# Patient Record
Sex: FEMALE | Race: WHITE | Employment: FULL TIME | URBAN - METROPOLITAN AREA
[De-identification: names, ages, dates, MRNs, and addresses within clinical notes are randomized per-mention and may not be internally consistent; named-entity substitution may affect disease eponyms.]

---

## 2019-09-30 ENCOUNTER — OFFICE VISIT (OUTPATIENT)
Dept: ENDOCRINOLOGY | Age: 29
End: 2019-09-30
Payer: COMMERCIAL

## 2019-09-30 VITALS
HEART RATE: 130 BPM | RESPIRATION RATE: 24 BRPM | OXYGEN SATURATION: 99 % | SYSTOLIC BLOOD PRESSURE: 138 MMHG | WEIGHT: 114.4 LBS | DIASTOLIC BLOOD PRESSURE: 88 MMHG | BODY MASS INDEX: 18.39 KG/M2 | HEIGHT: 66 IN

## 2019-09-30 DIAGNOSIS — E05.90 HYPERTHYROIDISM: Primary | ICD-10-CM

## 2019-09-30 PROCEDURE — 99204 OFFICE O/P NEW MOD 45 MIN: CPT | Performed by: INTERNAL MEDICINE

## 2019-09-30 RX ORDER — PROPRANOLOL HCL 60 MG
60 CAPSULE, EXTENDED RELEASE 24HR ORAL DAILY
Qty: 30 CAPSULE | Refills: 3 | Status: SHIPPED | OUTPATIENT
Start: 2019-09-30 | End: 2019-11-26

## 2019-09-30 RX ORDER — RANITIDINE 150 MG/1
150 TABLET ORAL
COMMUNITY
End: 2021-03-23 | Stop reason: ALTCHOICE

## 2019-09-30 RX ORDER — DEXTROAMPHETAMINE SACCHARATE, AMPHETAMINE ASPARTATE, DEXTROAMPHETAMINE SULFATE AND AMPHETAMINE SULFATE 7.5; 7.5; 7.5; 7.5 MG/1; MG/1; MG/1; MG/1
30 TABLET ORAL
COMMUNITY

## 2019-09-30 NOTE — PROGRESS NOTES
found for: TSH  No results found for: FREET4      Assessment:     Hyperthyroidism:  Will recheck levels, thyroid scan to work up cause. She has h/o miscarriages, plans for pregnancy. Will avoid VAZQUEZ for treatment, discussed PTU/side effects. Start treatment after results. Plan: 1. Check TFT, Trab  2. Thyroid scan and USG  3. Treatment after results, plan for PTU given plan for pregnancy.

## 2019-10-17 ENCOUNTER — HOSPITAL ENCOUNTER (OUTPATIENT)
Age: 29
Discharge: HOME | End: 2019-10-17
Payer: COMMERCIAL

## 2019-10-17 ENCOUNTER — HOSPITAL ENCOUNTER (OUTPATIENT)
Dept: ULTRASOUND IMAGING | Age: 29
Discharge: HOME | End: 2019-10-17
Payer: COMMERCIAL

## 2019-10-17 ENCOUNTER — HOSPITAL ENCOUNTER (OUTPATIENT)
Dept: NUCLEAR MEDICINE | Age: 29
Discharge: HOME | End: 2019-10-17
Payer: COMMERCIAL

## 2019-10-17 DIAGNOSIS — E05.90 HYPERTHYROIDISM: ICD-10-CM

## 2019-10-17 LAB
T3 FREE: 10.8 PG/ML (ref 2.3–4.2)
T4 FREE: 2.7 NG/DL (ref 0.9–1.8)
TSH REFLEX: <0.01 UIU/ML (ref 0.27–4.2)

## 2019-10-17 PROCEDURE — 84439 ASSAY OF FREE THYROXINE: CPT

## 2019-10-17 PROCEDURE — 83520 IMMUNOASSAY QUANT NOS NONAB: CPT

## 2019-10-17 PROCEDURE — 36415 COLL VENOUS BLD VENIPUNCTURE: CPT

## 2019-10-17 PROCEDURE — 84443 ASSAY THYROID STIM HORMONE: CPT

## 2019-10-17 PROCEDURE — A9516 IODINE I-123 SOD IODIDE MIC: HCPCS | Performed by: INTERNAL MEDICINE

## 2019-10-17 PROCEDURE — 84481 FREE ASSAY (FT-3): CPT

## 2019-10-17 PROCEDURE — 78014 THYROID IMAGING W/BLOOD FLOW: CPT

## 2019-10-17 PROCEDURE — 3430000000 HC RX DIAGNOSTIC RADIOPHARMACEUTICAL: Performed by: INTERNAL MEDICINE

## 2019-10-17 PROCEDURE — 76536 US EXAM OF HEAD AND NECK: CPT

## 2019-10-17 RX ORDER — PROPYLTHIOURACIL 50 MG/1
100 TABLET ORAL 3 TIMES DAILY
Qty: 90 TABLET | Refills: 3 | Status: SHIPPED | OUTPATIENT
Start: 2019-10-17 | End: 2019-11-26

## 2019-10-17 RX ADMIN — SODIUM IODIDE I 123 200 MICRO CURIE: 100 CAPSULE, GELATIN COATED ORAL at 10:00

## 2019-10-18 ENCOUNTER — HOSPITAL ENCOUNTER (OUTPATIENT)
Dept: NUCLEAR MEDICINE | Age: 29
Discharge: HOME | End: 2019-10-18
Payer: COMMERCIAL

## 2019-10-18 LAB — TSH RECEPTOR AB: 4.79 IU/L

## 2019-10-22 ENCOUNTER — TELEPHONE (OUTPATIENT)
Dept: ENDOCRINOLOGY | Age: 29
End: 2019-10-22

## 2019-11-26 ENCOUNTER — OFFICE VISIT (OUTPATIENT)
Dept: ENDOCRINOLOGY | Age: 29
End: 2019-11-26
Payer: COMMERCIAL

## 2019-11-26 VITALS
BODY MASS INDEX: 19.38 KG/M2 | OXYGEN SATURATION: 98 % | SYSTOLIC BLOOD PRESSURE: 134 MMHG | WEIGHT: 120.6 LBS | HEART RATE: 67 BPM | DIASTOLIC BLOOD PRESSURE: 83 MMHG | HEIGHT: 66 IN

## 2019-11-26 DIAGNOSIS — E05.90 HYPERTHYROIDISM: Primary | ICD-10-CM

## 2019-11-26 PROCEDURE — 99213 OFFICE O/P EST LOW 20 MIN: CPT | Performed by: INTERNAL MEDICINE

## 2019-11-26 RX ORDER — METHIMAZOLE 10 MG/1
TABLET ORAL
Qty: 60 TABLET | Refills: 1 | Status: SHIPPED | OUTPATIENT
Start: 2019-11-26 | End: 2021-03-23

## 2021-03-23 ENCOUNTER — OFFICE VISIT (OUTPATIENT)
Dept: ENDOCRINOLOGY | Age: 31
End: 2021-03-23
Payer: COMMERCIAL

## 2021-03-23 VITALS
WEIGHT: 121 LBS | SYSTOLIC BLOOD PRESSURE: 137 MMHG | RESPIRATION RATE: 18 BRPM | HEIGHT: 66 IN | DIASTOLIC BLOOD PRESSURE: 96 MMHG | BODY MASS INDEX: 19.44 KG/M2 | HEART RATE: 87 BPM | OXYGEN SATURATION: 98 %

## 2021-03-23 DIAGNOSIS — E05.00 GRAVES DISEASE: Primary | ICD-10-CM

## 2021-03-23 DIAGNOSIS — Z72.0 TOBACCO ABUSE: ICD-10-CM

## 2021-03-23 PROCEDURE — 99214 OFFICE O/P EST MOD 30 MIN: CPT | Performed by: INTERNAL MEDICINE

## 2021-03-23 RX ORDER — VALACYCLOVIR HYDROCHLORIDE 500 MG/1
500 TABLET, FILM COATED ORAL
COMMUNITY
Start: 2021-01-25

## 2021-03-23 NOTE — PROGRESS NOTES
Subjective:      26 y/o WF who is here for thyroid evaluation. Interim:    Seen after 11/19  Did not f/u  Was on tapazole 10mg BID  Had TFT    2 miscarriages in last year    Has hair loss, bodycahe  Brain fog, fatigue  Vision issues    She had labs 6/19, showed hyperthyroidism. Has Graves'    Labs:    FT4 4.19 FT3 16.6 TSH <0.006    Moderate, uncontrolled. Does not remember if had thyroid labs prior to it. At age 15 years, was told may have hyperthyroidism    She has had two miscarriages at 16 weeks, last 5/19  She has one child    Initial complaints: weight loss, palpitations for 3-4 months, no diarrhea/heat intolerance  History of obstructive symptoms:  difficulty swallowing No, changes in voice/hoarseness  No.  No eye changes    History of radiation to patient's neck:   No  Recent iodine exposure:  No  Family history includes paternal aunt, thyroid cancer    Current smoking of cigarettes or cigars: no    She is on Adderall for ADD    9/19  TSH < 0.01  FT4 2.7 FT3 10.8    USG shows heterogenous appearance, no nodules  1.  Elevated 24 hour uptake of 82% can be seen with Graves disease.  No   dominant hot or cold nodule. Start on PTU 100mg TID    11/19 TSH <0.015 Free level normal  4/20 TSH 2.06  Tapazole 10mg BID  3/21 TSH > 161    High cholesterol 265 on 3/21    Past Medical History:   Diagnosis Date    Acid reflux     ADD (attention deficit disorder)     Hyperthyroidism        History reviewed. No pertinent surgical history. Current Outpatient Medications   Medication Sig Dispense Refill    valACYclovir (VALTREX) 500 MG tablet Take 500 mg by mouth      methIMAzole (TAPAZOLE) 10 MG tablet One tab PO BID 60 tablet 1    amphetamine-dextroamphetamine (ADDERALL) 30 MG tablet Take 30 mg by mouth. No current facility-administered medications for this visit.         Review of Systems  Please see scanned     Objective:    BP (!) 137/96   Pulse 87   Resp 18   Ht 5' 6\" (1.676 m) Wt 121 lb (54.9 kg)   LMP 02/27/2021 (Exact Date)   SpO2 98%   Breastfeeding No   BMI 19.53 kg/m²   Wt Readings from Last 3 Encounters:   03/23/21 121 lb (54.9 kg)   11/26/19 120 lb 9.6 oz (54.7 kg)   09/30/19 114 lb 6.4 oz (51.9 kg)       Constitutional: Well-developed, appears stated age, cooperative, in no acute distress  H/E/N/M/T:atraumatic, normocephalic, external ears, nose, lips normal without lesions  Eyes: Lids, lashes, conjunctivae and sclerae normal, No proptosis, no redness  Neck: supple, symmetrical, no swelling  Skin: No obvious rashes or lesions present. Skin and hair texture normal  Psychiatric: Judgement and Insight:  judgement and insight appear normal  Neuro: Normal without focal findings, speech is normal normal, speech is spontaneous  Chest: No labored breathing, no chest deformity, no stridor  Musculoskeletal: No joint deformity, swelling  Thyroid: enlarged    Lab Review  Lab Results   Component Value Date    .000 03/19/2021     No results found for: FREET4      Assessment:     Hyperthyroidism:  Secondary to Graves'. She has h/o miscarriages, plans for pregnancy. She got lost to f/u, was taking tapazole, level not being monitored. Labs indicate severe hypothyroidism  Advised importance of regular f/u. Her symptoms are likely due to low thyroid. She may be in remission  Advised if levels fluctuate may plan for VAZQUEZ. She would like to try to avoid it  F.u in 4 weeks    Advised smoking cessation. See opthamology     Plan: 1. Stop Tapazole  2. TFT in 4 weeks  3. Advised smoking cessation

## 2021-05-14 ENCOUNTER — VIRTUAL VISIT (OUTPATIENT)
Dept: ENDOCRINOLOGY | Age: 31
End: 2021-05-14
Payer: COMMERCIAL

## 2021-05-14 DIAGNOSIS — Z72.0 TOBACCO ABUSE: ICD-10-CM

## 2021-05-14 DIAGNOSIS — E05.00 GRAVES DISEASE: Primary | ICD-10-CM

## 2021-05-14 PROCEDURE — 99213 OFFICE O/P EST LOW 20 MIN: CPT | Performed by: INTERNAL MEDICINE

## 2021-05-14 NOTE — PROGRESS NOTES
cholesterol 265 on 3/21    She was smoking but now quit for 3 weeks    Past Medical History:   Diagnosis Date    Acid reflux     ADD (attention deficit disorder)     Hyperthyroidism        History reviewed. No pertinent surgical history. Current Outpatient Medications   Medication Sig Dispense Refill    amphetamine-dextroamphetamine (ADDERALL) 30 MG tablet Take 30 mg by mouth.  valACYclovir (VALTREX) 500 MG tablet Take 500 mg by mouth       No current facility-administered medications for this visit. Review of Systems  Constitutional: Negative for weight loss and malaise/fatigue. Negative for fever and chills. HENT: Negative for hearing loss, ear pain, nosebleeds, neck pain and tinnitus. Eyes: Negative for blurred vision. Negative for double vision, photophobia and pain. Respiratory: Negative for cough and sputum production. Cardiovascular: Negative for chest pain, palpitations and leg swelling. Gastrointestinal: Negative for nausea, vomiting and abdominal pain. Genitourinary: Negative for dysuria, urgency and frequency. Musculoskeletal: Negative for back pain. No joint pain  Skin: Negative for itching and rash. Neurological: Negative for dizziness. Negative for tingling, tremors, focal weakness and headaches. Endo/Heme/Allergies: see HPI  Psychiatric/Behavioral: Negative for depression and substance abuse.          PHYSICAL EXAMINATION:  [ INSTRUCTIONS:  \"[x]\" Indicates a positive item  \"[]\" Indicates a negative item  -- DELETE ALL ITEMS NOT EXAMINED]  Vital Signs: (As obtained by patient/caregiver or practitioner observation)    Blood pressure-  Heart rate-    Respiratory rate-    Temperature-  Pulse oximetry-     Constitutional Appears well-developed and well-nourished No apparent distress        Mental status  Alert and awake  Oriented to person/place/time  Able to follow commands      Eyes:  EOM    [x]  Normal    Sclera  [x]  Normal           Discharge [x]  None visible HENT:   [x] Normocephalic, atraumatic. [x] Mouth/Throat: Mucous membranes are moist.     External Ears [x] Normal  no discharge    Neck: [x] No visualized mass  no swelling    Pulmonary/Chest: [x] Respiratory effort normal.  [x] No visualized signs of difficulty breathing or respiratory distress             Musculoskeletal:   [x] Normal gait with no signs of ataxia         [x] Normal range of motion of neck          Head and neck stable, appears normal ROM, strength good    Neurological:        [x] No Facial Asymmetry (Cranial nerve 7 motor function) (limited exam to video visit)          [x] No gaze palsy                Skin:        [x] No significant exanthematous lesions or discoloration noted on facial skin                 Psychiatric:       [x] Normal Affect [x] No Hallucinations            Other pertinent observable physical exam findings-     Due to this being a TeleHealth encounter, evaluation of the following organ systems is limited: Vitals/Constitutional/EENT/Resp/CV/GI//MS/Neuro/Skin/Heme-Lymph-Imm. Services were provided through a video synchronous discussion virtually to substitute for in-person clinic visit. Lab Review  Lab Results   Component Value Date    TSH 4.120 05/12/2021     No results found for: FREET4      Assessment:     Hyperthyroidism:  Secondary to Graves'. She has h/o miscarriages, plans for pregnancy. She got lost to f/u, was taking tapazole, level not being monitored. Labs indicate severe hypothyroidism  Advised importance of regular f/u. Her symptoms are likely due to low thyroid. She is in remission  Advised if levels fluctuate may plan for VAZQUEZ. She would like to try to avoid it  Repeat level off medication normal    Advised smoking cessation. See opthamology. She quit     Plan: 1. Continue to hold Tapazole  2. TFT in 8 weeks  3. Advised smoking cessation

## 2021-09-13 ENCOUNTER — OFFICE VISIT (OUTPATIENT)
Dept: ENDOCRINOLOGY | Age: 31
End: 2021-09-13
Payer: COMMERCIAL

## 2021-09-13 VITALS
DIASTOLIC BLOOD PRESSURE: 83 MMHG | WEIGHT: 123.6 LBS | OXYGEN SATURATION: 98 % | BODY MASS INDEX: 19.86 KG/M2 | HEART RATE: 113 BPM | SYSTOLIC BLOOD PRESSURE: 120 MMHG | HEIGHT: 66 IN

## 2021-09-13 DIAGNOSIS — E05.90 HYPERTHYROIDISM: Primary | ICD-10-CM

## 2021-09-13 PROCEDURE — 99213 OFFICE O/P EST LOW 20 MIN: CPT | Performed by: INTERNAL MEDICINE

## 2021-09-13 RX ORDER — ASPIRIN 81 MG/1
81 TABLET, CHEWABLE ORAL DAILY
COMMUNITY
End: 2022-05-12 | Stop reason: ALTCHOICE

## 2021-09-13 NOTE — PROGRESS NOTES
Subjective:      26 y/o WF who is here for thyroid evaluation. Interim:    Off tapazole   18 weeks pregnant  Stable  Pregnancy good per patient  Gyn with [facility]  TPO Ab requested by Gyn    Previously:    Seen after 11/19  Did not f/u  Was on tapazole 10mg BID  Had TFT    2 miscarriages in last year    Has hair loss, bodycahe  Brain fog, fatigue  Vision issues    She had labs 6/19, showed hyperthyroidism. Has Graves'    Labs:    FT4 4.19 FT3 16.6 TSH <0.006    Moderate, uncontrolled. Does not remember if had thyroid labs prior to it. At age 15 years, was told may have hyperthyroidism    She has had two miscarriages at 16 weeks, last 5/19  She has one child    Initial complaints: weight loss, palpitations for 3-4 months, no diarrhea/heat intolerance  History of obstructive symptoms:  difficulty swallowing No, changes in voice/hoarseness  No.  No eye changes    History of radiation to patient's neck:   No  Recent iodine exposure:  No  Family history includes paternal aunt, thyroid cancer    Current smoking of cigarettes or cigars: no    She is on Adderall for ADD    9/19  TSH < 0.01  FT4 2.7 FT3 10.8    USG shows heterogenous appearance, no nodules  1.  Elevated 24 hour uptake of 82% can be seen with Graves disease.  No   dominant hot or cold nodule. Started on PTU 100mg TID    11/19 TSH <0.015 Free level normal  4/20 TSH 2.06  Tapazole 10mg BID  3/21 TSH > 161  5/21 TSH 4.1  Off tapazole    7/21 TSH 0.36    High cholesterol 265 on 3/21    She was smoking but now quit for 3 weeks    Past Medical History:   Diagnosis Date    Acid reflux     ADD (attention deficit disorder)     Hyperthyroidism        No past surgical history on file. Current Outpatient Medications   Medication Sig Dispense Refill    valACYclovir (VALTREX) 500 MG tablet Take 500 mg by mouth      amphetamine-dextroamphetamine (ADDERALL) 30 MG tablet Take 30 mg by mouth.        No current facility-administered medications for this visit. Review of Systems  Scanned, reviewed    PHYSICAL EXAMINATION:    Vitals:    09/13/21 1549   BP: 120/83   Pulse: 113   SpO2: 98%       Constitutional: Well-developed, appears stated age, cooperative, in no acute distress  H/E/N/M/T:atraumatic, normocephalic, external ears, nose, lips normal without lesions  Eyes: Lids, lashes, conjunctivae and sclerae normal, No proptosis, no redness  Neck: supple, symmetrical, no swelling  Skin: No obvious rashes or lesions present. Skin and hair texture normal  Psychiatric: Judgement and Insight:  judgement and insight appear normal  Neuro: Normal without focal findings, speech is normal normal, speech is spontaneous  Chest: No labored breathing, no chest deformity, no stridor  Musculoskeletal: No joint deformity, swelling      Lab Review  Lab Results   Component Value Date    TSH 0.366 07/22/2021     No results found for: FREET4      Assessment:     Hyperthyroidism:  Secondary to Graves'. She has h/o miscarriages, plans for pregnancy. She got lost to f/u, was taking tapazole, level not being monitored. Labs indicated severe hypothyroidism  Advised importance of regular f/u. Her symptoms are likely due to low thyroid. She is in remission  Advised if levels fluctuate may plan for VAZQUEZ. She would like to try to avoid it  Repeat level off medication normal. Last TSH slightly low. Given pregnancy, planned to monitor  Check levels  She is tachycardic but states even before pregnancy    Advised smoking cessation. See opthamology. She quit     Plan: 1. Continue to hold Tapazole  2. TFT today and in 6 weeks  3. F/u in 4 months

## 2022-01-17 ENCOUNTER — OFFICE VISIT (OUTPATIENT)
Dept: ENDOCRINOLOGY | Age: 32
End: 2022-01-17
Payer: COMMERCIAL

## 2022-01-17 VITALS
HEART RATE: 133 BPM | DIASTOLIC BLOOD PRESSURE: 88 MMHG | OXYGEN SATURATION: 99 % | WEIGHT: 146.4 LBS | HEIGHT: 66 IN | SYSTOLIC BLOOD PRESSURE: 124 MMHG | BODY MASS INDEX: 23.53 KG/M2

## 2022-01-17 DIAGNOSIS — E05.00 GRAVES DISEASE: ICD-10-CM

## 2022-01-17 DIAGNOSIS — E05.90 HYPERTHYROIDISM: Primary | ICD-10-CM

## 2022-01-17 PROCEDURE — 99214 OFFICE O/P EST MOD 30 MIN: CPT | Performed by: INTERNAL MEDICINE

## 2022-01-17 RX ORDER — METHIMAZOLE 5 MG/1
TABLET ORAL
Qty: 30 TABLET | Refills: 3 | Status: SHIPPED | OUTPATIENT
Start: 2022-01-17 | End: 2022-02-09

## 2022-01-17 NOTE — PROGRESS NOTES
Subjective:      26 y/o WF who is here for thyroid evaluation. Interim:    C/o excessive sweating, heat intolerance    Off tapazole   36  weeks pregnant    Stable  Pregnancy good per patient  Gyn with [facility]  TPO Ab requested by Gyn    Second pregnancy    Previously:    Seen after 11/19  Did not f/u  Was on tapazole 10mg BID  Had TFT    2 miscarriages in last year    Has hair loss, bodycahe  Brain fog, fatigue  Vision issues    She had labs 6/19, showed hyperthyroidism. Has Graves'    Labs:    FT4 4.19 FT3 16.6 TSH <0.006    Moderate, uncontrolled. Does not remember if had thyroid labs prior to it. At age 15 years, was told may have hyperthyroidism    She has had two miscarriages at 16 weeks, last 5/19  She has one child    Initial complaints: weight loss, palpitations for 3-4 months, no diarrhea/heat intolerance  History of obstructive symptoms:  difficulty swallowing No, changes in voice/hoarseness  No.  No eye changes    History of radiation to patient's neck:   No  Recent iodine exposure:  No  Family history includes paternal aunt, thyroid cancer    Current smoking of cigarettes or cigars: no    She is on Adderall for ADD    9/19  TSH < 0.01  FT4 2.7 FT3 10.8    USG shows heterogenous appearance, no nodules  1.  Elevated 24 hour uptake of 82% can be seen with Graves disease.  No   dominant hot or cold nodule. Started on PTU 100mg TID    11/19 TSH <0.015 Free level normal  4/20 TSH 2.06  Tapazole 10mg BID  3/21 TSH > 161  5/21 TSH 4.1  Off tapazole    7/21 TSH 0.36  9/21 TSH 0.9  12/21 TSH 0.278  1/22 TSH 0.079 FT4 1.22    High cholesterol 265 on 3/21    She was smoking but now quit for 3 weeks    Past Medical History:   Diagnosis Date    Acid reflux     ADD (attention deficit disorder)     Hyperthyroidism        History reviewed. No pertinent surgical history.   Current Outpatient Medications   Medication Sig Dispense Refill    aspirin 81 MG chewable tablet Take 81 mg by mouth daily      Prenatal Multivit-Min-Fe-FA (PRE- PO) Take by mouth      valACYclovir (VALTREX) 500 MG tablet Take 500 mg by mouth      amphetamine-dextroamphetamine (ADDERALL) 30 MG tablet Take 30 mg by mouth. No current facility-administered medications for this visit. Review of Systems  Scanned, reviewed    PHYSICAL EXAMINATION:    Vitals:    22 1024   BP: 124/88   Pulse: 133   SpO2: 99%       Constitutional: Well-developed, appears stated age, cooperative, in no acute distress  H/E/N/M/T:atraumatic, normocephalic, external ears, nose, lips normal without lesions  Eyes: Lids, lashes, conjunctivae and sclerae normal, No proptosis, no redness  Neck: supple, symmetrical, no swelling  Skin: No obvious rashes or lesions present. Skin and hair texture normal  Psychiatric: Judgement and Insight:  judgement and insight appear normal  Neuro: Normal without focal findings, speech is normal normal, speech is spontaneous  Chest: No labored breathing, no chest deformity, no stridor  Musculoskeletal: No joint deformity, swelling      Lab Review  Lab Results   Component Value Date    TSH 0.079 2022     No results found for: FREET4      Assessment:     Hyperthyroidism:  Secondary to Graves'. She has h/o miscarriages, plans for pregnancy. She was in remission, now TSH lower. She has had tachycardia. Some symptoms which can be due to pregnancy. Will restart low dose tapazole. Advised if levels fluctuate may plan for VAZQUEZ. She would like to try to avoid it    Advised smoking cessation. See opthamology. She quit     Tachycardia: She is subclinical hyperthyroid, has a h/o tachycardia. Free level normal so thyroid may not be causing it. She sees her OB/Gyn today, advised to discuss    Plan: 1. Tapazole 5mg daily  2. TFT in 3 months  3. F/u in 3  months

## 2022-02-09 RX ORDER — METHIMAZOLE 5 MG/1
TABLET ORAL
Qty: 30 TABLET | Refills: 3 | Status: SHIPPED | OUTPATIENT
Start: 2022-02-09 | End: 2022-05-12 | Stop reason: SDUPTHER

## 2022-05-11 LAB
ALBUMIN SERPL-MCNC: 3.9 GM/DL
ALP BLD-CCNC: 86 UNIT/L
ALT SERPL-CCNC: 22 UNIT/L
AST SERPL-CCNC: 32 UNIT/L
BILIRUB SERPL-MCNC: <0.1 MG/DL
BILIRUBIN DIRECT: 0 MG/DL
BILIRUBIN, INDIRECT: <0.1 MG/DL
PROTEIN TOTAL: 6.8 GM/DL
T4 FREE: 3.17 NG/DL
TSH SERPL DL<=0.05 MIU/L-ACNC: <0.015 UIU/ML

## 2022-05-12 ENCOUNTER — OFFICE VISIT (OUTPATIENT)
Dept: ENDOCRINOLOGY | Age: 32
End: 2022-05-12
Payer: COMMERCIAL

## 2022-05-12 VITALS
HEIGHT: 66 IN | SYSTOLIC BLOOD PRESSURE: 122 MMHG | WEIGHT: 119.2 LBS | HEART RATE: 112 BPM | DIASTOLIC BLOOD PRESSURE: 70 MMHG | BODY MASS INDEX: 19.16 KG/M2 | OXYGEN SATURATION: 98 %

## 2022-05-12 DIAGNOSIS — E05.90 HYPERTHYROIDISM: Primary | ICD-10-CM

## 2022-05-12 PROCEDURE — 99214 OFFICE O/P EST MOD 30 MIN: CPT | Performed by: INTERNAL MEDICINE

## 2022-05-12 RX ORDER — METHIMAZOLE 10 MG/1
TABLET ORAL
Qty: 30 TABLET | Refills: 3 | Status: SHIPPED | OUTPATIENT
Start: 2022-05-12 | End: 2022-05-17 | Stop reason: SDUPTHER

## 2022-05-12 NOTE — PROGRESS NOTES
Subjective:      28 y/o WF who is here for thyroid evaluation. Interim:    3 month post partum  She is breast feeding    C/o excessive sweating, heat intolerance  Weight loss      Stable  Pregnancy good per patient  Gyn with [facility]  TPO Ab requested by Gyn    Second pregnancy    Previously:    Seen after 11/19  Did not f/u  Was on tapazole 10mg BID  Had TFT    2 miscarriages in a year    Has hair loss, bodycahe  Brain fog, fatigue  Vision issues    She had labs 6/19, showed hyperthyroidism. Has Graves'    Labs:    FT4 4.19 FT3 16.6 TSH <0.006    Moderate, uncontrolled. Does not remember if had thyroid labs prior to it. At age 15 years, was told may have hyperthyroidism    She has had two miscarriages at 16 weeks, last 5/19  She has one child    Initial complaints: weight loss, palpitations for 3-4 months, no diarrhea/heat intolerance  History of obstructive symptoms:  difficulty swallowing No, changes in voice/hoarseness  No.  No eye changes    History of radiation to patient's neck:   No  Recent iodine exposure:  No  Family history includes paternal aunt, thyroid cancer    Current smoking of cigarettes or cigars: no    She is on Adderall for ADD    9/19  TSH < 0.01  FT4 2.7 FT3 10.8    USG shows heterogenous appearance, no nodules  1.  Elevated 24 hour uptake of 82% can be seen with Graves disease.  No   dominant hot or cold nodule. Started on PTU 100mg TID    11/19 TSH <0.015 Free level normal  4/20 TSH 2.06  Tapazole 10mg BID  3/21 TSH > 161  5/21 TSH 4.1  Off tapazole    7/21 TSH 0.36  9/21 TSH 0.9  12/21 TSH 0.278  1/22 TSH 0.079 FT4 1.22   5/22 TSH <0.015  FT4 3.17  On tapazole 5mg      High cholesterol 265 on 3/21    She was smoking but now quit for 3 weeks    Past Medical History:   Diagnosis Date    Acid reflux     ADD (attention deficit disorder)     Hyperthyroidism        History reviewed. No pertinent surgical history.   Current Outpatient Medications   Medication Sig Dispense Refill    methIMAzole (TAPAZOLE) 5 MG tablet TAKE 1 TABLET BY MOUTH EVERY DAY 30 tablet 3    aspirin 81 MG chewable tablet Take 81 mg by mouth daily      Prenatal Multivit-Min-Fe-FA (PRE- PO) Take by mouth      valACYclovir (VALTREX) 500 MG tablet Take 500 mg by mouth      amphetamine-dextroamphetamine (ADDERALL) 30 MG tablet Take 30 mg by mouth. No current facility-administered medications for this visit. Review of Systems  Scanned, reviewed    PHYSICAL EXAMINATION:    Vitals:    22 1607   BP: 122/70   Pulse: 112   SpO2: 98%       Constitutional: Well-developed, appears stated age, cooperative, in no acute distress  H/E/N/M/T:atraumatic, normocephalic, external ears, nose, lips normal without lesions  Eyes: Lids, lashes, conjunctivae and sclerae normal, No proptosis, no redness  Neck: supple, symmetrical, no swelling  Skin: No obvious rashes or lesions present. Skin and hair texture normal  Psychiatric: Judgement and Insight:  judgement and insight appear normal  Neuro: Normal without focal findings, speech is normal normal, speech is spontaneous  Chest: No labored breathing, no chest deformity, no stridor  Musculoskeletal: No joint deformity, swelling      Lab Review  Lab Results   Component Value Date    TSH <0.015 2022     No results found for: FREET4      Assessment:     Hyperthyroidism:  Secondary to Graves'. She has h/o miscarriages, plans for pregnancy. She was in remission, had Relapse, TSH lower,  restarted low dose tapazole. Still hyperthyroid, increase dose  Advise to take tapazole after breast feeding. Discussed it is present in breast milk in low dose and is recommended can be continued with a max dose of 20mg/day. Advised weight monitoring of the baby. Advised if levels fluctuate may plan for VAZQUEZ. She would like to try to avoid it    Advised smoking cessation. See opthamology. She quit     Tachycardia: She is subclinical hyperthyroid    Plan: 1.Tapazole 5mg ---> 10mg daily  2. TFT in 2 months and then 4 months  3. F/u in 4  months

## 2022-05-17 RX ORDER — METHIMAZOLE 10 MG/1
TABLET ORAL
Qty: 45 TABLET | Refills: 3 | Status: SHIPPED | OUTPATIENT
Start: 2022-05-17 | End: 2022-06-10

## 2022-06-10 RX ORDER — METHIMAZOLE 10 MG/1
TABLET ORAL
Qty: 45 TABLET | Refills: 3 | Status: SHIPPED | OUTPATIENT
Start: 2022-06-10 | End: 2022-06-21 | Stop reason: SDUPTHER

## 2022-06-10 NOTE — TELEPHONE ENCOUNTER
Medication:   Requested Prescriptions     Pending Prescriptions Disp Refills    methIMAzole (TAPAZOLE) 10 MG tablet [Pharmacy Med Name: METHIMAZOLE 10 MG TABLET] 45 tablet 3     Sig: TAKE 1 AND 1/2 TABLETS BY MOUTH EVERY DAY       Last Filled:      Patient Phone Number: [phone] (home)     Last appt: 5/12/2022   Next appt: 9/15/2022    Last Thyroid:   Lab Results   Component Value Date    TSH <0.015 05/11/2022    FT3 10.8 10/17/2019    T4FREE 3.17 05/11/2022

## 2022-06-20 LAB — TSH SERPL DL<=0.05 MIU/L-ACNC: <0.015 UIU/ML

## 2022-06-21 RX ORDER — METHIMAZOLE 10 MG/1
TABLET ORAL
Qty: 60 TABLET | Refills: 1 | Status: SHIPPED | OUTPATIENT
Start: 2022-06-21 | End: 2022-08-24

## 2022-07-08 RX ORDER — METHIMAZOLE 5 MG/1
TABLET ORAL
Qty: 30 TABLET | Refills: 3 | OUTPATIENT
Start: 2022-07-08

## 2022-08-24 RX ORDER — METHIMAZOLE 10 MG/1
TABLET ORAL
Qty: 60 TABLET | Refills: 1 | Status: SHIPPED | OUTPATIENT
Start: 2022-08-24 | End: 2022-09-26 | Stop reason: SDUPTHER

## 2022-08-24 NOTE — TELEPHONE ENCOUNTER
Medication:   Requested Prescriptions     Pending Prescriptions Disp Refills    methIMAzole (TAPAZOLE) 10 MG tablet [Pharmacy Med Name: METHIMAZOLE 10 MG TABLET] 60 tablet 1     Sig: TAKE 2 TABLETS BY MOUTH EVERY DAY       Last Filled:      Patient Phone Number: [phone] (home)     Last appt: 1/17/2022   Next appt: 09/15/2022    Last Thyroid:   Lab Results   Component Value Date/Time    TSH <0.015 06/20/2022 02:17 PM    FT3 10.8 10/17/2019 09:47 AM    T4FREE 3.17 05/11/2022 04:08 PM

## 2022-09-19 RX ORDER — METHIMAZOLE 10 MG/1
TABLET ORAL
Qty: 60 TABLET | Refills: 1 | OUTPATIENT
Start: 2022-09-19

## 2022-09-19 NOTE — TELEPHONE ENCOUNTER
Medication:   Requested Prescriptions     Pending Prescriptions Disp Refills    methIMAzole (TAPAZOLE) 10 MG tablet [Pharmacy Med Name: METHIMAZOLE 10 MG TABLET] 60 tablet 1     Sig: TAKE 2 TABLETS BY MOUTH EVERY DAY       Last Filled:      Patient Phone Number: [phone] (home)     Last appt: 05/12/2022  Next appt: Visit date not found    Last Thyroid:   Lab Results   Component Value Date/Time    TSH <0.015 06/20/2022 02:17 PM    FT3 10.8 10/17/2019 09:47 AM    T4FREE 3.17 05/11/2022 04:08 PM

## 2022-09-22 LAB
ALBUMIN SERPL-MCNC: 4.1 GM/DL
ALP BLD-CCNC: 78 UNIT/L
ALT SERPL-CCNC: 14 UNIT/L
AST SERPL-CCNC: 21 UNIT/L
BILIRUB SERPL-MCNC: 0.3 MG/DL
BILIRUBIN DIRECT: 0.1 MG/DL
PROTEIN TOTAL: 6.7 GM/DL
T4 FREE: 0.45 NG/DL
TSH SERPL DL<=0.05 MIU/L-ACNC: 16.6 UIU/ML

## 2022-09-26 ENCOUNTER — OFFICE VISIT (OUTPATIENT)
Dept: ENDOCRINOLOGY | Age: 32
End: 2022-09-26
Payer: COMMERCIAL

## 2022-09-26 VITALS
HEIGHT: 66 IN | HEART RATE: 85 BPM | SYSTOLIC BLOOD PRESSURE: 120 MMHG | BODY MASS INDEX: 18.32 KG/M2 | WEIGHT: 114 LBS | OXYGEN SATURATION: 99 % | DIASTOLIC BLOOD PRESSURE: 78 MMHG

## 2022-09-26 DIAGNOSIS — E05.90 HYPERTHYROIDISM: Primary | ICD-10-CM

## 2022-09-26 PROCEDURE — 99214 OFFICE O/P EST MOD 30 MIN: CPT | Performed by: INTERNAL MEDICINE

## 2022-09-26 RX ORDER — METHIMAZOLE 10 MG/1
TABLET ORAL
Qty: 30 TABLET | Refills: 3 | Status: SHIPPED | OUTPATIENT
Start: 2022-09-26 | End: 2022-10-16 | Stop reason: SDUPTHER

## 2022-09-26 NOTE — PROGRESS NOTES
Subjective:      26 y/o WF who is here for thyroid evaluation. Interim:    7 month post partum  She is breast feeding  Plan for a year    She may plan for pregnancy after it    C/o excessive sweating, heat intolerance  Weight loss      Stable  Pregnancy good per patient  [facility]yn with [facility]  TPO Ab requested by Gyn    Second pregnancy    Previously:    Seen after 11/19  Did not f/u  Was on tapazole 10mg BID  Had TFT    2 miscarriages in a year    Has hair loss, bodycahe  Brain fog, fatigue  Vision issues    She had labs 6/19, showed hyperthyroidism. Has Graves'    Labs:    FT4 4.19 FT3 16.6 TSH <0.006    Moderate, uncontrolled. Does not remember if had thyroid labs prior to it. At age 15 years, was told may have hyperthyroidism    She has had two miscarriages at 16 weeks, last 5/19  She has two child    Initial complaints: weight loss, palpitations for 3-4 months, no diarrhea/heat intolerance  History of obstructive symptoms:  difficulty swallowing No, changes in voice/hoarseness  No.  No eye changes    History of radiation to patient's neck:   No  Recent iodine exposure:  No  Family history includes paternal aunt, thyroid cancer    Current smoking of cigarettes or cigars: no    She is on Adderall for ADD    9/19  TSH < 0.01  FT4 2.7 FT3 10.8    USG shows heterogenous appearance, no nodules  1. Elevated 24 hour uptake of 82% can be seen with Graves disease. No   dominant hot or cold nodule.         Started on PTU 100mg TID    11/19 TSH <0.015 Free level normal  4/20 TSH 2.06  Tapazole 10mg BID  3/21 TSH > 161  5/21 TSH 4.1  Off tapazole    7/21 TSH 0.36  9/21 TSH 0.9  12/21 TSH 0.278  1/22 TSH 0.079 FT4 1.22   5/22 TSH <0.015  FT4 3.17  On tapazole 5mg    FT3 13.9  Trab 28  Change dose to 15mg    6/22 TSH <0.0015  Change dose to 20mg    9/22 TSH 16  FT4 0.45    High cholesterol 265 on 3/21    She was smoking but now quit for 3 weeks    Past Medical History:   Diagnosis Date    Acid reflux ADD (attention deficit disorder)     Hyperthyroidism        No past surgical history on file. Current Outpatient Medications   Medication Sig Dispense Refill    methIMAzole (TAPAZOLE) 10 MG tablet TAKE 2 TABLETS BY MOUTH EVERY DAY 60 tablet 1    Prenatal Multivit-Min-Fe-FA (PRE- PO) Take by mouth      valACYclovir (VALTREX) 500 MG tablet Take 500 mg by mouth      amphetamine-dextroamphetamine (ADDERALL) 30 MG tablet Take 30 mg by mouth. No current facility-administered medications for this visit. Review of Systems  Scanned, reviewed    PHYSICAL EXAMINATION:    Vitals:    22 1139   BP: 120/78   Pulse: 85   SpO2: 99%       Constitutional: Well-developed, appears stated age, cooperative, in no acute distress  H/E/N/M/T:atraumatic, normocephalic, external ears, nose, lips normal without lesions  Eyes: Lids, lashes, conjunctivae and sclerae normal, No proptosis, no redness  Neck: supple, symmetrical, no swelling  Skin: No obvious rashes or lesions present. Skin and hair texture normal  Psychiatric: Judgement and Insight:  judgement and insight appear normal  Neuro: Normal without focal findings, speech is normal normal, speech is spontaneous  Chest: No labored breathing, no chest deformity, no stridor  Musculoskeletal: No joint deformity, swelling      Lab Review  Lab Results   Component Value Date/Time    TSH 16.600 2022 01:29 PM     No results found for: FREET4      Assessment:     Hyperthyroidism:  Secondary to Graves'. She has h/o miscarriages, plans for pregnancy. She was in remission, had Relapse, TSH lower,  restarted low dose tapazole. Advise to take tapazole after breast feeding. Discussed it is present in breast milk in low dose and is recommended can be continued with a max dose of 20mg/day. Advised weight monitoring of the baby. Advised if levels fluctuate may plan for VAZQUEZ. Will wait for now as breastfeeding and plan for pregnancy    Advised smoking cessation.  See opthamology. She quit       Plan: 1. Tapazole 20mg---> 10mg daily  2. TFT in 6 weeks and 3 months  3. F/u in 3 months

## 2022-10-17 RX ORDER — METHIMAZOLE 10 MG/1
TABLET ORAL
Qty: 30 TABLET | Refills: 3 | Status: SHIPPED | OUTPATIENT
Start: 2022-10-17

## 2022-10-17 NOTE — TELEPHONE ENCOUNTER
Medication:   Requested Prescriptions     Pending Prescriptions Disp Refills    methIMAzole (TAPAZOLE) 10 MG tablet 30 tablet 3     Sig: One tab daily       Last Filled:      Patient Phone Number: [phone] (home)     Last appt: 9/26/2022   Next appt: 12/12/2022    Last Thyroid:   Lab Results   Component Value Date/Time    TSH 16.600 09/22/2022 01:29 PM    FT3 10.8 10/17/2019 09:47 AM    T4FREE 0.45 09/22/2022 01:29 PM

## 2022-10-19 LAB
T4 FREE: 0.96 NG/DL
TSH SERPL DL<=0.05 MIU/L-ACNC: 2.32 UIU/ML

## 2022-12-08 LAB
ALBUMIN SERPL-MCNC: 4.6 GM/DL
ALP BLD-CCNC: 77 UNIT/L
ALT SERPL-CCNC: 14 UNIT/L
AST SERPL-CCNC: 19 UNIT/L
BILIRUB SERPL-MCNC: 0.4 MG/DL
BILIRUBIN DIRECT: 0.2 MG/DL
PROTEIN TOTAL: 7.3 GM/DL
T4 FREE: 0.91 NG/DL
TSH SERPL DL<=0.05 MIU/L-ACNC: 7.11 UIU/ML

## 2022-12-23 ENCOUNTER — TELEMEDICINE (OUTPATIENT)
Dept: ENDOCRINOLOGY | Age: 32
End: 2022-12-23
Payer: COMMERCIAL

## 2022-12-23 DIAGNOSIS — E05.00 GRAVES DISEASE: Primary | ICD-10-CM

## 2022-12-23 PROCEDURE — 99214 OFFICE O/P EST MOD 30 MIN: CPT | Performed by: INTERNAL MEDICINE

## 2022-12-23 RX ORDER — METHIMAZOLE 10 MG/1
TABLET ORAL
Qty: 15 TABLET | Refills: 5 | Status: SHIPPED | OUTPATIENT
Start: 2022-12-23

## 2022-12-23 NOTE — PROGRESS NOTES
Patient was evaluated through a synchronous (real-time) audio-video  encounter. The patient (or guardian if applicable) is aware that this is a billable service, which includes applicable co-pays. This Virtual Visit was  conducted with patient's (and/or legal guardian's) consent. The visit was conducted pursuant to the emergency declaration under the [facility], [facility] authority and the [employer] Act. Patient identification was verified,  and a caregiver was present when appropriate. The patient was located in a state where the provider was licensed to provide care. Patient had issue with Adam/Camera, can hear and see me. Completed call through phone    Subjective:      29 y/o WF who is here for thyroid evaluation. Interim:    Fatigue  Sweating    7 month post partum  She is breast feeding  Plan for a year    She may plan for pregnancy after it    C/o excessive sweating, heat intolerance  Weight loss      Stable  Pregnancy good per patient  [facility]  TPO Ab requested by Gyn    Second pregnancy    Previously:    Seen after 11/19  Did not f/u  Was on tapazole 10mg BID  Had TFT    2 miscarriages in a year    Has hair loss, bodycahe  Brain fog, fatigue  Vision issues    She had labs 6/19, showed hyperthyroidism. Has Graves'    Labs:    FT4 4.19 FT3 16.6 TSH <0.006    Moderate, uncontrolled. Does not remember if had thyroid labs prior to it.     At age 15 years, was told may have hyperthyroidism    She has had two miscarriages at 16 weeks, last 5/19  She has two child    Initial complaints: weight loss, palpitations for 3-4 months, no diarrhea/heat intolerance  History of obstructive symptoms:  difficulty swallowing No, changes in voice/hoarseness  No.  No eye changes    History of radiation to patient's neck:   No  Recent iodine exposure:  No  Family history includes paternal aunt, thyroid cancer    Current smoking of cigarettes or cigars: no    She is on Adderall for ADD      TSH < 0.01  FT4 2.7 FT3 10.8    USG shows heterogenous appearance, no nodules  1. Elevated 24 hour uptake of 82% can be seen with Graves disease. No   dominant hot or cold nodule. Started on PTU 100mg TID     TSH <0.015 Free level normal   TSH 2.06  Tapazole 10mg BID  3/21 TSH > 161   TSH 4.1  Off tapazole     TSH 0.36   TSH 0.9   TSH 0.278   TSH 0.079 FT4 1.22    TSH <0.015  FT4 3.17  On tapazole 5mg    FT3 13.9  Trab 28  Change dose to 15mg     TSH <0.0015  Change dose to 20mg     TSH 16  FT4 0.45  10/22 TSH 2.32  on tapazole 10mg   Trab 2.12 TSH 7.11    High cholesterol 265 on 3/21    She was smoking but now quit for 3 weeks    Past Medical History:   Diagnosis Date    Acid reflux     ADD (attention deficit disorder)     Hyperthyroidism        History reviewed. No pertinent surgical history. Current Outpatient Medications   Medication Sig Dispense Refill    methIMAzole (TAPAZOLE) 10 MG tablet One tab daily 30 tablet 3    Prenatal Multivit-Min-Fe-FA (PRE- PO) Take by mouth      valACYclovir (VALTREX) 500 MG tablet Take 500 mg by mouth      amphetamine-dextroamphetamine (ADDERALL) 30 MG tablet Take 30 mg by mouth. No current facility-administered medications for this visit. Review of Systems  Scanned, reviewed    PHYSICAL EXAMINATION:    Constitutional: Negative for weight loss and malaise/fatigue. Negative for fever and chills. HENT: Negative for hearing loss, ear pain, nosebleeds, neck pain and tinnitus. Eyes: Negative for blurred vision. Negative for double vision, photophobia and pain. Respiratory: Negative for cough and sputum production. Cardiovascular: Negative for chest pain, palpitations and leg swelling. Gastrointestinal: Negative for nausea, vomiting and abdominal pain.    Genitourinary: Negative for dysuria, urgency and frequency. Musculoskeletal: Negative for back pain. No joint pain  Skin: Negative for itching and rash. Neurological: Negative for dizziness. Negative for tingling, tremors, focal weakness and headaches. Endo/Heme/Allergies: see HPI  Psychiatric/Behavioral: Negative for depression and substance abuse. Lab Review  Lab Results   Component Value Date/Time    TSH 7.110 12/08/2022 02:34 PM     No results found for: FREET4      Assessment:     Hyperthyroidism:  Secondary to Graves'. She has h/o miscarriages, plans for pregnancy. She was in remission, had Relapse, TSH lower,  restarted low dose tapazole. Advise to take tapazole after breast feeding. Discussed it is present in breast milk in low dose and is recommended can be continued with a max dose of 20mg/day. Advised weight monitoring of the baby. Advised if levels fluctuate may plan for VAZQUEZ. Will wait for now as breastfeeding and plan for pregnancy within next 2 year  TSH higher, decrease it to 5mg  Advised smoking cessation. See opthamology. She quit       Plan: 1. Tapazole 10mg daily--->5 mg  2. TFT in 6 weeks and 3 months  3. F/u in 3 months

## 2023-01-27 RX ORDER — METHIMAZOLE 10 MG/1
TABLET ORAL
Qty: 15 TABLET | Refills: 1 | Status: SHIPPED | OUTPATIENT
Start: 2023-01-27

## 2023-01-27 NOTE — TELEPHONE ENCOUNTER
Medication:   Requested Prescriptions     Pending Prescriptions Disp Refills    methIMAzole (TAPAZOLE) 10 MG tablet [Pharmacy Med Name: METHIMAZOLE 10 MG TABLET] 15 tablet 5     Sig: TAKE 1/2 TABLET BY MOUTH DAILY       Last Filled:      Patient Phone Number: [phone] (home)     Last appt: 12/23/2022   Next appt: Visit date not found    Last Thyroid:   Lab Results   Component Value Date/Time    TSH 7.110 12/08/2022 02:34 PM    FT3 10.8 10/17/2019 09:47 AM    T4FREE 0.91 12/08/2022 02:34 PM

## 2023-03-29 ENCOUNTER — HOSPITAL ENCOUNTER (OUTPATIENT)
Facility: HOSPITAL | Age: 33
Discharge: HOME | End: 2023-03-29
Payer: COMMERCIAL

## 2023-03-29 ENCOUNTER — OFFICE VISIT (OUTPATIENT)
Dept: FAMILY MEDICINE CLINIC | Age: 33
End: 2023-03-29
Payer: COMMERCIAL

## 2023-03-29 VITALS
TEMPERATURE: 97.2 F | DIASTOLIC BLOOD PRESSURE: 68 MMHG | WEIGHT: 108.6 LBS | OXYGEN SATURATION: 99 % | HEIGHT: 66 IN | HEART RATE: 98 BPM | RESPIRATION RATE: 18 BRPM | SYSTOLIC BLOOD PRESSURE: 99 MMHG | BODY MASS INDEX: 17.45 KG/M2

## 2023-03-29 DIAGNOSIS — R53.82 CHRONIC FATIGUE: ICD-10-CM

## 2023-03-29 DIAGNOSIS — E05.00 GRAVES DISEASE: ICD-10-CM

## 2023-03-29 DIAGNOSIS — E55.9 VITAMIN D DEFICIENCY: ICD-10-CM

## 2023-03-29 DIAGNOSIS — B37.9 YEAST INFECTION: ICD-10-CM

## 2023-03-29 DIAGNOSIS — L30.1 DYSHIDROTIC FOOT DERMATITIS: ICD-10-CM

## 2023-03-29 DIAGNOSIS — E05.00 GRAVES DISEASE: Primary | ICD-10-CM

## 2023-03-29 DIAGNOSIS — R73.09 ELEVATED GLUCOSE: ICD-10-CM

## 2023-03-29 PROCEDURE — 84443 ASSAY THYROID STIM HORMONE: CPT

## 2023-03-29 PROCEDURE — 83036 HEMOGLOBIN GLYCOSYLATED A1C: CPT

## 2023-03-29 PROCEDURE — 85025 COMPLETE CBC W/AUTO DIFF WBC: CPT

## 2023-03-29 PROCEDURE — 80053 COMPREHEN METABOLIC PANEL: CPT

## 2023-03-29 PROCEDURE — 84439 ASSAY OF FREE THYROXINE: CPT

## 2023-03-29 PROCEDURE — 82306 VITAMIN D 25 HYDROXY: CPT

## 2023-03-29 PROCEDURE — 99204 OFFICE O/P NEW MOD 45 MIN: CPT | Performed by: NURSE PRACTITIONER

## 2023-03-29 PROCEDURE — 36415 COLL VENOUS BLD VENIPUNCTURE: CPT

## 2023-03-29 RX ORDER — FLUCONAZOLE 150 MG/1
150 TABLET ORAL DAILY
Qty: 3 TABLET | Refills: 0 | Status: SHIPPED | OUTPATIENT
Start: 2023-03-29 | End: 2023-04-01

## 2023-03-29 RX ORDER — PIMECROLIMUS 10 MG/G
CREAM TOPICAL
Qty: 60 G | Refills: 2 | Status: SHIPPED | OUTPATIENT
Start: 2023-03-29

## 2023-03-29 SDOH — ECONOMIC STABILITY: TRANSPORTATION INSECURITY
IN THE PAST 12 MONTHS, HAS LACK OF TRANSPORTATION KEPT YOU FROM MEETINGS, WORK, OR FROM GETTING THINGS NEEDED FOR DAILY LIVING?: NO

## 2023-03-29 SDOH — ECONOMIC STABILITY: HOUSING INSECURITY
IN THE LAST 12 MONTHS, WAS THERE A TIME WHEN YOU DID NOT HAVE A STEADY PLACE TO SLEEP OR SLEPT IN A SHELTER (INCLUDING NOW)?: NO

## 2023-03-29 SDOH — ECONOMIC STABILITY: FOOD INSECURITY: WITHIN THE PAST 12 MONTHS, THE FOOD YOU BOUGHT JUST DIDN'T LAST AND YOU DIDN'T HAVE MONEY TO GET MORE.: NEVER TRUE

## 2023-03-29 SDOH — ECONOMIC STABILITY: FOOD INSECURITY: WITHIN THE PAST 12 MONTHS, YOU WORRIED THAT YOUR FOOD WOULD RUN OUT BEFORE YOU GOT MONEY TO BUY MORE.: NEVER TRUE

## 2023-03-29 SDOH — ECONOMIC STABILITY: INCOME INSECURITY: HOW HARD IS IT FOR YOU TO PAY FOR THE VERY BASICS LIKE FOOD, HOUSING, MEDICAL CARE, AND HEATING?: NOT HARD AT ALL

## 2023-03-29 ASSESSMENT — PATIENT HEALTH QUESTIONNAIRE - PHQ9
SUM OF ALL RESPONSES TO PHQ QUESTIONS 1-9: 0
2. FEELING DOWN, DEPRESSED OR HOPELESS: 0
SUM OF ALL RESPONSES TO PHQ9 QUESTIONS 1 & 2: 0
1. LITTLE INTEREST OR PLEASURE IN DOING THINGS: 0
SUM OF ALL RESPONSES TO PHQ QUESTIONS 1-9: 0

## 2023-03-29 NOTE — PROGRESS NOTES
topically 2 times daily. Dispense: 60 g; Refill: 2    3. Yeast infection  Take medication as directed and f/u if s/s persist or worsen. - fluconazole (DIFLUCAN) 150 MG tablet; Take 1 tablet by mouth daily for 3 days  Dispense: 3 tablet; Refill: 0    4. Chronic fatigue  Labs drawn today  - Comprehensive Metabolic Panel; Future  - CBC with Auto Differential; Future    5. Elevated glucose  Labs drawn today  - Hemoglobin A1C; Future    6. Vitamin D deficiency  Labs drawn today  - Vitamin D 25 Hydroxy;  Future           Carie Quiros, APRALEXYS - CNP

## 2023-03-30 LAB
25(OH)D3 SERPL-MCNC: 29.4 NG/ML (ref 32–100)
ALBUMIN SERPL-MCNC: 4.5 G/DL (ref 3.4–4.8)
ALBUMIN/GLOB SERPL: 2 {RATIO} (ref 0.8–2)
ALP SERPL-CCNC: 61 U/L (ref 25–100)
ALT SERPL-CCNC: 10 U/L (ref 4–36)
ANION GAP SERPL CALCULATED.3IONS-SCNC: 7 MMOL/L (ref 3–16)
AST SERPL-CCNC: 13 U/L (ref 8–33)
BASOPHILS # BLD: 0 K/UL (ref 0–0.1)
BASOPHILS NFR BLD: 0.6 %
BILIRUB SERPL-MCNC: 0.4 MG/DL (ref 0.3–1.2)
BUN SERPL-MCNC: 11 MG/DL (ref 6–20)
CALCIUM SERPL-MCNC: 9 MG/DL (ref 8.5–10.5)
CHLORIDE SERPL-SCNC: 103 MMOL/L (ref 98–107)
CO2 SERPL-SCNC: 29 MMOL/L (ref 20–30)
CREAT SERPL-MCNC: 0.7 MG/DL (ref 0.4–1.2)
EOSINOPHIL # BLD: 0.2 K/UL (ref 0–0.4)
EOSINOPHIL NFR BLD: 3.1 %
ERYTHROCYTE [DISTWIDTH] IN BLOOD BY AUTOMATED COUNT: 12.3 % (ref 11–16)
GFR SERPLBLD CREATININE-BSD FMLA CKD-EPI: >60 ML/MIN/{1.73_M2}
GLOBULIN SER CALC-MCNC: 2.3 G/DL
GLUCOSE SERPL-MCNC: 89 MG/DL (ref 74–106)
HBA1C MFR BLD: 5.3 %
HCT VFR BLD AUTO: 42.5 % (ref 37–47)
HGB BLD-MCNC: 13.9 G/DL (ref 11.5–16.5)
IMM GRANULOCYTES # BLD: 0 K/UL
IMM GRANULOCYTES NFR BLD: 0.2 % (ref 0–5)
LYMPHOCYTES # BLD: 1.6 K/UL (ref 1.5–4)
LYMPHOCYTES NFR BLD: 29 %
MCH RBC QN AUTO: 32.5 PG (ref 27–32)
MCHC RBC AUTO-ENTMCNC: 32.7 G/DL (ref 31–35)
MCV RBC AUTO: 99.3 FL (ref 80–100)
MONOCYTES # BLD: 0.4 K/UL (ref 0.2–0.8)
MONOCYTES NFR BLD: 7 %
NEUTROPHILS # BLD: 3.3 K/UL (ref 2–7.5)
NEUTS SEG NFR BLD: 60.1 %
PLATELET # BLD AUTO: 217 K/UL (ref 150–400)
PMV BLD AUTO: 10.8 FL (ref 6–10)
POTASSIUM SERPL-SCNC: 3.9 MMOL/L (ref 3.4–5.1)
PROT SERPL-MCNC: 6.8 G/DL (ref 6.4–8.3)
RBC # BLD AUTO: 4.28 M/UL (ref 3.8–5.8)
SODIUM SERPL-SCNC: 139 MMOL/L (ref 136–145)
T4 FREE SERPL-MCNC: 1.19 NG/DL (ref 0.89–1.76)
TSH SERPL-MCNC: 5.68 UIU/ML (ref 0.27–4.2)
WBC # BLD AUTO: 5.5 K/UL (ref 4–11)

## 2023-04-19 ENCOUNTER — OFFICE VISIT (OUTPATIENT)
Dept: FAMILY MEDICINE CLINIC | Age: 33
End: 2023-04-19
Payer: COMMERCIAL

## 2023-04-19 VITALS
HEIGHT: 66 IN | OXYGEN SATURATION: 99 % | DIASTOLIC BLOOD PRESSURE: 62 MMHG | BODY MASS INDEX: 17 KG/M2 | SYSTOLIC BLOOD PRESSURE: 102 MMHG | WEIGHT: 105.8 LBS | TEMPERATURE: 98.6 F | HEART RATE: 87 BPM | RESPIRATION RATE: 18 BRPM

## 2023-04-19 DIAGNOSIS — L30.1 DYSHIDROTIC FOOT DERMATITIS: ICD-10-CM

## 2023-04-19 DIAGNOSIS — E05.00 GRAVES DISEASE: Primary | ICD-10-CM

## 2023-04-19 PROCEDURE — 99203 OFFICE O/P NEW LOW 30 MIN: CPT | Performed by: FAMILY MEDICINE

## 2023-04-19 RX ORDER — FLUCONAZOLE 100 MG/1
100 TABLET ORAL DAILY
Qty: 7 TABLET | Refills: 0 | Status: SHIPPED | OUTPATIENT
Start: 2023-04-19 | End: 2023-04-26

## 2023-04-19 SDOH — HEALTH STABILITY: PHYSICAL HEALTH: ON AVERAGE, HOW MANY DAYS PER WEEK DO YOU ENGAGE IN MODERATE TO STRENUOUS EXERCISE (LIKE A BRISK WALK)?: 7 DAYS

## 2023-04-19 SDOH — HEALTH STABILITY: PHYSICAL HEALTH: ON AVERAGE, HOW MANY MINUTES DO YOU ENGAGE IN EXERCISE AT THIS LEVEL?: 120 MIN

## 2023-04-19 ASSESSMENT — SOCIAL DETERMINANTS OF HEALTH (SDOH)
WITHIN THE LAST YEAR, HAVE YOU BEEN HUMILIATED OR EMOTIONALLY ABUSED IN OTHER WAYS BY YOUR PARTNER OR EX-PARTNER?: NO
WITHIN THE LAST YEAR, HAVE YOU BEEN AFRAID OF YOUR PARTNER OR EX-PARTNER?: NO
WITHIN THE LAST YEAR, HAVE TO BEEN RAPED OR FORCED TO HAVE ANY KIND OF SEXUAL ACTIVITY BY YOUR PARTNER OR EX-PARTNER?: NO
WITHIN THE LAST YEAR, HAVE YOU BEEN KICKED, HIT, SLAPPED, OR OTHERWISE PHYSICALLY HURT BY YOUR PARTNER OR EX-PARTNER?: NO

## 2023-04-21 ENCOUNTER — PATIENT MESSAGE (OUTPATIENT)
Dept: FAMILY MEDICINE CLINIC | Age: 33
End: 2023-04-21

## 2023-04-21 ENCOUNTER — TELEPHONE (OUTPATIENT)
Dept: FAMILY MEDICINE CLINIC | Age: 33
End: 2023-04-21

## 2023-04-21 NOTE — TELEPHONE ENCOUNTER
From: Mckenzie Franco  To: Dr. Carolina Ibarra: 4/21/2023 10:25 AM EDT  Subject: Courtney Lorne cream    Hi Waylon Castillo said my insurance requires a PA on Elidel cream. They faxed the form to your office again on Wednesday. Thanks!   Nisswalo Junes

## 2023-04-24 ENCOUNTER — TELEPHONE (OUTPATIENT)
Dept: FAMILY MEDICINE CLINIC | Age: 33
End: 2023-04-24

## 2023-05-02 DIAGNOSIS — F90.0 ATTENTION DEFICIT HYPERACTIVITY DISORDER (ADHD), PREDOMINANTLY INATTENTIVE TYPE: Primary | ICD-10-CM

## 2023-05-02 RX ORDER — DEXTROAMPHETAMINE SACCHARATE, AMPHETAMINE ASPARTATE, DEXTROAMPHETAMINE SULFATE AND AMPHETAMINE SULFATE 7.5; 7.5; 7.5; 7.5 MG/1; MG/1; MG/1; MG/1
30 TABLET ORAL DAILY
Qty: 30 TABLET | Refills: 0 | Status: CANCELLED | OUTPATIENT
Start: 2023-05-02 | End: 2023-06-01

## 2023-05-02 RX ORDER — DEXTROAMPHETAMINE SACCHARATE, AMPHETAMINE ASPARTATE, DEXTROAMPHETAMINE SULFATE AND AMPHETAMINE SULFATE 7.5; 7.5; 7.5; 7.5 MG/1; MG/1; MG/1; MG/1
30 TABLET ORAL DAILY
Qty: 30 TABLET | Refills: 0 | Status: SHIPPED | OUTPATIENT
Start: 2023-05-02 | End: 2023-05-06 | Stop reason: SDUPTHER

## 2023-05-06 DIAGNOSIS — F90.0 ATTENTION DEFICIT HYPERACTIVITY DISORDER (ADHD), PREDOMINANTLY INATTENTIVE TYPE: ICD-10-CM

## 2023-05-06 RX ORDER — DEXTROAMPHETAMINE SACCHARATE, AMPHETAMINE ASPARTATE, DEXTROAMPHETAMINE SULFATE AND AMPHETAMINE SULFATE 7.5; 7.5; 7.5; 7.5 MG/1; MG/1; MG/1; MG/1
30 TABLET ORAL 2 TIMES DAILY
Qty: 60 TABLET | Refills: 0 | Status: SHIPPED | OUTPATIENT
Start: 2023-05-06 | End: 2023-06-05

## 2023-05-24 ENCOUNTER — LAB (OUTPATIENT)
Dept: LAB | Facility: HOSPITAL | Age: 33
End: 2023-05-24
Payer: COMMERCIAL

## 2023-05-24 ENCOUNTER — TRANSCRIBE ORDERS (OUTPATIENT)
Dept: LAB | Facility: HOSPITAL | Age: 33
End: 2023-05-24
Payer: COMMERCIAL

## 2023-05-24 DIAGNOSIS — Z32.01 PREGNANCY EXAMINATION OR TEST, POSITIVE RESULT: ICD-10-CM

## 2023-05-24 DIAGNOSIS — Z87.59 PERSONAL HISTORY OF TROPHOBLASTIC DISEASE: ICD-10-CM

## 2023-05-24 DIAGNOSIS — Z32.01 PREGNANCY EXAMINATION OR TEST, POSITIVE RESULT: Primary | ICD-10-CM

## 2023-05-24 LAB
ALBUMIN SERPL-MCNC: 4.3 G/DL (ref 3.5–5.2)
ALP SERPL-CCNC: 45 U/L (ref 39–117)
ALT SERPL W P-5'-P-CCNC: 15 U/L (ref 1–33)
AST SERPL-CCNC: 14 U/L (ref 1–32)
BILIRUB CONJ SERPL-MCNC: <0.2 MG/DL (ref 0–0.3)
BILIRUB INDIRECT SERPL-MCNC: NORMAL MG/DL
BILIRUB SERPL-MCNC: <0.2 MG/DL (ref 0–1.2)
HCG INTACT+B SERPL-ACNC: 3747 MIU/ML
PROGEST SERPL-MCNC: 3.35 NG/ML
PROT SERPL-MCNC: 6.8 G/DL (ref 6–8.5)
T3FREE SERPL-MCNC: 3.71 PG/ML (ref 2–4.4)
T4 FREE SERPL-MCNC: 1.27 NG/DL (ref 0.93–1.7)
TSH SERPL DL<=0.05 MIU/L-ACNC: 0.45 UIU/ML (ref 0.27–4.2)

## 2023-05-24 PROCEDURE — 86800 THYROGLOBULIN ANTIBODY: CPT

## 2023-05-24 PROCEDURE — 36415 COLL VENOUS BLD VENIPUNCTURE: CPT

## 2023-05-24 PROCEDURE — 84144 ASSAY OF PROGESTERONE: CPT

## 2023-05-24 PROCEDURE — 84702 CHORIONIC GONADOTROPIN TEST: CPT

## 2023-05-24 PROCEDURE — 86376 MICROSOMAL ANTIBODY EACH: CPT

## 2023-05-24 PROCEDURE — 84443 ASSAY THYROID STIM HORMONE: CPT

## 2023-05-24 PROCEDURE — 84439 ASSAY OF FREE THYROXINE: CPT

## 2023-05-24 PROCEDURE — 84481 FREE ASSAY (FT-3): CPT

## 2023-05-24 PROCEDURE — 80076 HEPATIC FUNCTION PANEL: CPT

## 2023-05-25 LAB
THYROGLOB AB SERPL-ACNC: 20 IU/ML (ref 0–0.9)
THYROPEROXIDASE AB SERPL-ACNC: 194 IU/ML (ref 0–34)

## 2023-05-30 ENCOUNTER — OFFICE VISIT (OUTPATIENT)
Dept: FAMILY MEDICINE CLINIC | Age: 33
End: 2023-05-30
Payer: COMMERCIAL

## 2023-05-30 ENCOUNTER — HOSPITAL ENCOUNTER (OUTPATIENT)
Facility: HOSPITAL | Age: 33
Discharge: HOME | End: 2023-05-30
Payer: COMMERCIAL

## 2023-05-30 VITALS
OXYGEN SATURATION: 99 % | RESPIRATION RATE: 18 BRPM | SYSTOLIC BLOOD PRESSURE: 100 MMHG | HEIGHT: 66 IN | BODY MASS INDEX: 18.16 KG/M2 | HEART RATE: 110 BPM | TEMPERATURE: 99.3 F | WEIGHT: 113 LBS | DIASTOLIC BLOOD PRESSURE: 60 MMHG

## 2023-05-30 DIAGNOSIS — Z32.01 POSITIVE PREGNANCY TEST: ICD-10-CM

## 2023-05-30 DIAGNOSIS — O20.0 THREATENED ABORTION: ICD-10-CM

## 2023-05-30 DIAGNOSIS — E05.00 GRAVES DISEASE: Primary | ICD-10-CM

## 2023-05-30 DIAGNOSIS — F90.0 ATTENTION DEFICIT HYPERACTIVITY DISORDER (ADHD), PREDOMINANTLY INATTENTIVE TYPE: ICD-10-CM

## 2023-05-30 DIAGNOSIS — E05.00 GRAVES DISEASE: ICD-10-CM

## 2023-05-30 PROCEDURE — 84443 ASSAY THYROID STIM HORMONE: CPT

## 2023-05-30 PROCEDURE — 99214 OFFICE O/P EST MOD 30 MIN: CPT | Performed by: FAMILY MEDICINE

## 2023-05-30 PROCEDURE — 84439 ASSAY OF FREE THYROXINE: CPT

## 2023-05-30 PROCEDURE — 84702 CHORIONIC GONADOTROPIN TEST: CPT

## 2023-05-30 PROCEDURE — 36415 COLL VENOUS BLD VENIPUNCTURE: CPT

## 2023-05-30 RX ORDER — DEXTROAMPHETAMINE SACCHARATE, AMPHETAMINE ASPARTATE, DEXTROAMPHETAMINE SULFATE AND AMPHETAMINE SULFATE 7.5; 7.5; 7.5; 7.5 MG/1; MG/1; MG/1; MG/1
30 TABLET ORAL 2 TIMES DAILY
Qty: 60 TABLET | Refills: 0 | Status: SHIPPED | OUTPATIENT
Start: 2023-05-30 | End: 2023-06-29

## 2023-05-30 RX ORDER — PROGESTERONE 100 MG/1
CAPSULE ORAL
COMMUNITY
Start: 2023-05-26

## 2023-05-31 LAB
HCG INTACT+B SERPL-ACNC: 1667 MIU/ML
T4 FREE SERPL-MCNC: 1.3 NG/DL (ref 0.89–1.76)
TSH SERPL DL<=0.005 MIU/L-ACNC: 0.58 UIU/ML (ref 0.27–4.2)

## 2023-06-29 DIAGNOSIS — F90.0 ATTENTION DEFICIT HYPERACTIVITY DISORDER (ADHD), PREDOMINANTLY INATTENTIVE TYPE: ICD-10-CM

## 2023-06-29 NOTE — TELEPHONE ENCOUNTER
Refill request received from pharmacy      Next Office Visit Date:  Future Appointments   Date Time Provider [address]   7/26/2023  9:45 AM MD Roselyn Victoria - Please review via [address]

## 2023-07-03 DIAGNOSIS — F90.0 ATTENTION DEFICIT HYPERACTIVITY DISORDER (ADHD), PREDOMINANTLY INATTENTIVE TYPE: ICD-10-CM

## 2023-07-05 RX ORDER — DEXTROAMPHETAMINE SACCHARATE, AMPHETAMINE ASPARTATE, DEXTROAMPHETAMINE SULFATE AND AMPHETAMINE SULFATE 7.5; 7.5; 7.5; 7.5 MG/1; MG/1; MG/1; MG/1
30 TABLET ORAL 2 TIMES DAILY
Qty: 60 TABLET | Refills: 0 | OUTPATIENT
Start: 2023-07-05 | End: 2023-08-04

## 2023-07-05 RX ORDER — DEXTROAMPHETAMINE SACCHARATE, AMPHETAMINE ASPARTATE, DEXTROAMPHETAMINE SULFATE AND AMPHETAMINE SULFATE 7.5; 7.5; 7.5; 7.5 MG/1; MG/1; MG/1; MG/1
30 TABLET ORAL 2 TIMES DAILY
Qty: 60 TABLET | Refills: 0 | Status: SHIPPED | OUTPATIENT
Start: 2023-07-05 | End: 2023-08-04

## 2023-07-05 NOTE — TELEPHONE ENCOUNTER
Refill request received from pharmacy      Next Office Visit Date:  Future Appointments   Date Time Provider [address]   7/26/2023  9:45 AM MD Roselyn Benoit - Please review via [address]

## 2023-07-17 ENCOUNTER — HOSPITAL ENCOUNTER (OUTPATIENT)
Facility: HOSPITAL | Age: 33
Discharge: HOME | End: 2023-07-17
Payer: COMMERCIAL

## 2023-07-17 DIAGNOSIS — F90.0 ATTENTION DEFICIT HYPERACTIVITY DISORDER (ADHD), PREDOMINANTLY INATTENTIVE TYPE: Primary | ICD-10-CM

## 2023-07-17 DIAGNOSIS — F90.0 ATTENTION DEFICIT HYPERACTIVITY DISORDER (ADHD), PREDOMINANTLY INATTENTIVE TYPE: ICD-10-CM

## 2023-07-17 PROCEDURE — 36415 COLL VENOUS BLD VENIPUNCTURE: CPT

## 2023-07-17 PROCEDURE — 84443 ASSAY THYROID STIM HORMONE: CPT

## 2023-07-17 PROCEDURE — 84439 ASSAY OF FREE THYROXINE: CPT

## 2023-07-18 LAB
T4 FREE SERPL-MCNC: 2.18 NG/DL (ref 0.89–1.76)
TSH SERPL-MCNC: <0.01 UIU/ML (ref 0.27–4.2)

## 2023-07-18 NOTE — RESULT ENCOUNTER NOTE
Saw Key's daughter as a patient yesterday and Munira Mora asked if I would order her thyroid labs to have available for her visit with you on 7/26/23.

## 2023-07-26 ENCOUNTER — OFFICE VISIT (OUTPATIENT)
Dept: FAMILY MEDICINE CLINIC | Age: 33
End: 2023-07-26
Payer: COMMERCIAL

## 2023-07-26 VITALS
HEART RATE: 92 BPM | RESPIRATION RATE: 18 BRPM | BODY MASS INDEX: 18.32 KG/M2 | DIASTOLIC BLOOD PRESSURE: 62 MMHG | OXYGEN SATURATION: 97 % | SYSTOLIC BLOOD PRESSURE: 92 MMHG | TEMPERATURE: 98.2 F | WEIGHT: 114 LBS | HEIGHT: 66 IN

## 2023-07-26 DIAGNOSIS — E05.00 GRAVES DISEASE: Primary | ICD-10-CM

## 2023-07-26 DIAGNOSIS — F90.0 ATTENTION DEFICIT HYPERACTIVITY DISORDER (ADHD), PREDOMINANTLY INATTENTIVE TYPE: ICD-10-CM

## 2023-07-26 PROCEDURE — 99214 OFFICE O/P EST MOD 30 MIN: CPT | Performed by: FAMILY MEDICINE

## 2023-07-26 RX ORDER — DEXTROAMPHETAMINE SACCHARATE, AMPHETAMINE ASPARTATE, DEXTROAMPHETAMINE SULFATE AND AMPHETAMINE SULFATE 7.5; 7.5; 7.5; 7.5 MG/1; MG/1; MG/1; MG/1
30 TABLET ORAL 2 TIMES DAILY
Qty: 60 TABLET | Refills: 0 | Status: SHIPPED | OUTPATIENT
Start: 2023-07-26 | End: 2023-07-26 | Stop reason: SDUPTHER

## 2023-07-26 RX ORDER — METHIMAZOLE 10 MG/1
10 TABLET ORAL DAILY
Qty: 30 TABLET | Refills: 2 | Status: SHIPPED | OUTPATIENT
Start: 2023-07-26

## 2023-07-26 RX ORDER — METHIMAZOLE 10 MG/1
10 TABLET ORAL DAILY
COMMUNITY
End: 2023-07-26 | Stop reason: SDUPTHER

## 2023-07-26 RX ORDER — DEXTROAMPHETAMINE SACCHARATE, AMPHETAMINE ASPARTATE, DEXTROAMPHETAMINE SULFATE AND AMPHETAMINE SULFATE 7.5; 7.5; 7.5; 7.5 MG/1; MG/1; MG/1; MG/1
30 TABLET ORAL 2 TIMES DAILY
Qty: 60 TABLET | Refills: 0 | Status: SHIPPED | OUTPATIENT
Start: 2023-07-26 | End: 2023-08-25

## 2023-07-26 NOTE — PROGRESS NOTES
Chief Complaint   Patient presents with    Graves' Disease     Reg f/u and refills    ADD     Reg f/u       Have you seen any other physician or provider since your last visit no    Have you had any other diagnostic tests since your last visit? no    Have you changed or stopped any medications since your last visit? no

## 2023-08-07 DIAGNOSIS — F90.0 ATTENTION DEFICIT HYPERACTIVITY DISORDER (ADHD), PREDOMINANTLY INATTENTIVE TYPE: ICD-10-CM

## 2023-08-10 DIAGNOSIS — F90.0 ATTENTION DEFICIT HYPERACTIVITY DISORDER (ADHD), PREDOMINANTLY INATTENTIVE TYPE: ICD-10-CM

## 2023-08-10 RX ORDER — DEXTROAMPHETAMINE SACCHARATE, AMPHETAMINE ASPARTATE, DEXTROAMPHETAMINE SULFATE AND AMPHETAMINE SULFATE 7.5; 7.5; 7.5; 7.5 MG/1; MG/1; MG/1; MG/1
30 TABLET ORAL 2 TIMES DAILY
Qty: 60 TABLET | Refills: 0 | OUTPATIENT
Start: 2023-08-10 | End: 2023-09-09

## 2023-08-10 NOTE — TELEPHONE ENCOUNTER
Patient can not get her Adderall filled at her current [facility], [address], [place] - they are out of the medication. She asked that it be [facility] as they do have it in stock currently. Medication sent to Dr. Joao Driscoll for approval. Dr. Clayton Feliz is currently out of the office     Patient called, requested refill.        Next Office Visit Date:  Future Appointments   Date Time Provider [address]   10/25/2023  9:30 AM MD Roselyn Benoit please review via PDMP

## 2023-08-11 RX ORDER — DEXTROAMPHETAMINE SACCHARATE, AMPHETAMINE ASPARTATE, DEXTROAMPHETAMINE SULFATE AND AMPHETAMINE SULFATE 7.5; 7.5; 7.5; 7.5 MG/1; MG/1; MG/1; MG/1
30 TABLET ORAL 2 TIMES DAILY
Qty: 60 TABLET | Refills: 0 | Status: SHIPPED | OUTPATIENT
Start: 2023-08-11 | End: 2023-09-10

## 2023-08-14 ENCOUNTER — PATIENT MESSAGE (OUTPATIENT)
Dept: FAMILY MEDICINE CLINIC | Age: 33
End: 2023-08-14

## 2023-08-15 ENCOUNTER — TELEPHONE (OUTPATIENT)
Dept: FAMILY MEDICINE CLINIC | Age: 33
End: 2023-08-15

## 2023-08-15 DIAGNOSIS — F90.0 ATTENTION DEFICIT HYPERACTIVITY DISORDER (ADHD), PREDOMINANTLY INATTENTIVE TYPE: ICD-10-CM

## 2023-08-15 NOTE — TELEPHONE ENCOUNTER
Prescription needs sent to [facility] in [place] again due to it saying \"Do not fill until September 1st\" Patient stated that it was due August 5th. It was sent first to [facility] in [facility] but due to supply they couldn't fill it so it needed to be sent to [facility] in [place].

## 2023-08-15 NOTE — TELEPHONE ENCOUNTER
From: Emmy Moulton  To: Dr. David Vieira: 8/14/2023 10:40 AM EDT  Subject: Adderall refill    I've been trying to get a new prescription sent for Adderall 30mg tabs since 8/7. I called [facility] in [place] and they still haven't received the script. I am completely out and can't  the prescription that was sent to [facility] in [place] because they are out of stock and have no eta on when they will receive it.

## 2023-08-15 NOTE — TELEPHONE ENCOUNTER
----- Message from Charline Adam sent at 8/14/2023 11:34 AM EDT -----  Regarding: Medication problem  Pharmacy is out of Adderal. Needs sent to [id number] in [place].

## 2023-08-16 RX ORDER — DEXTROAMPHETAMINE SACCHARATE, AMPHETAMINE ASPARTATE, DEXTROAMPHETAMINE SULFATE AND AMPHETAMINE SULFATE 7.5; 7.5; 7.5; 7.5 MG/1; MG/1; MG/1; MG/1
30 TABLET ORAL 2 TIMES DAILY
Qty: 60 TABLET | Refills: 0 | Status: SHIPPED | OUTPATIENT
Start: 2023-08-16 | End: 2023-09-15

## 2023-08-17 ENCOUNTER — HOSPITAL ENCOUNTER (OUTPATIENT)
Facility: HOSPITAL | Age: 33
Discharge: HOME | End: 2023-08-17
Payer: COMMERCIAL

## 2023-08-17 DIAGNOSIS — E05.00 GRAVES DISEASE: ICD-10-CM

## 2023-08-17 LAB
T4 FREE SERPL-MCNC: 1.92 NG/DL (ref 0.89–1.76)
TSH SERPL DL<=0.005 MIU/L-ACNC: <0.01 UIU/ML (ref 0.27–4.2)

## 2023-08-17 PROCEDURE — 84443 ASSAY THYROID STIM HORMONE: CPT

## 2023-08-17 PROCEDURE — 84439 ASSAY OF FREE THYROXINE: CPT

## 2023-08-17 PROCEDURE — 36415 COLL VENOUS BLD VENIPUNCTURE: CPT

## 2023-09-15 DIAGNOSIS — F90.0 ATTENTION DEFICIT HYPERACTIVITY DISORDER (ADHD), PREDOMINANTLY INATTENTIVE TYPE: ICD-10-CM

## 2023-09-18 NOTE — TELEPHONE ENCOUNTER
Patient called, requested refill.        Next Office Visit Date:  Future Appointments   Date Time Provider [address]   10/25/2023  9:30 AM MD Roselyn Victoria please review via PDMP

## 2023-09-18 NOTE — TELEPHONE ENCOUNTER
Refill request received from pharmacy      Next Office Visit Date:  Future Appointments   Date Time Provider [address]   10/25/2023  9:30 AM MD Roselyn Lane - Please review via PDMP

## 2023-09-21 RX ORDER — DEXTROAMPHETAMINE SACCHARATE, AMPHETAMINE ASPARTATE, DEXTROAMPHETAMINE SULFATE AND AMPHETAMINE SULFATE 7.5; 7.5; 7.5; 7.5 MG/1; MG/1; MG/1; MG/1
30 TABLET ORAL 2 TIMES DAILY
Qty: 60 TABLET | Refills: 0 | Status: SHIPPED | OUTPATIENT
Start: 2023-09-21 | End: 2023-10-21

## 2023-10-18 DIAGNOSIS — F90.0 ATTENTION DEFICIT HYPERACTIVITY DISORDER (ADHD), PREDOMINANTLY INATTENTIVE TYPE: ICD-10-CM

## 2023-10-18 RX ORDER — DEXTROAMPHETAMINE SACCHARATE, AMPHETAMINE ASPARTATE, DEXTROAMPHETAMINE SULFATE AND AMPHETAMINE SULFATE 7.5; 7.5; 7.5; 7.5 MG/1; MG/1; MG/1; MG/1
30 TABLET ORAL 2 TIMES DAILY
Qty: 60 TABLET | Refills: 0 | Status: SHIPPED | OUTPATIENT
Start: 2023-10-18 | End: 2023-11-17

## 2023-10-18 NOTE — TELEPHONE ENCOUNTER
Patient called, requested refill.        Next Office Visit Date:  Future Appointments   Date Time Provider [address]   10/25/2023  9:30 AM MD Roselyn Shah please review via [address]

## 2023-10-25 ENCOUNTER — HOSPITAL ENCOUNTER (OUTPATIENT)
Facility: HOSPITAL | Age: 33
Discharge: HOME | End: 2023-10-25
Payer: COMMERCIAL

## 2023-10-25 ENCOUNTER — OFFICE VISIT (OUTPATIENT)
Dept: FAMILY MEDICINE CLINIC | Age: 33
End: 2023-10-25
Payer: COMMERCIAL

## 2023-10-25 VITALS
WEIGHT: 112.4 LBS | OXYGEN SATURATION: 99 % | SYSTOLIC BLOOD PRESSURE: 106 MMHG | HEIGHT: 66 IN | HEART RATE: 92 BPM | DIASTOLIC BLOOD PRESSURE: 68 MMHG | TEMPERATURE: 99.1 F | RESPIRATION RATE: 16 BRPM | BODY MASS INDEX: 18.06 KG/M2

## 2023-10-25 DIAGNOSIS — E05.00 GRAVES DISEASE: ICD-10-CM

## 2023-10-25 DIAGNOSIS — R05.9 COUGH, UNSPECIFIED TYPE: Primary | ICD-10-CM

## 2023-10-25 DIAGNOSIS — Z91.89 AT RISK FOR FOOT PROBLEM: ICD-10-CM

## 2023-10-25 DIAGNOSIS — L98.9 SKIN PROBLEM: ICD-10-CM

## 2023-10-25 DIAGNOSIS — F90.0 ATTENTION DEFICIT HYPERACTIVITY DISORDER (ADHD), PREDOMINANTLY INATTENTIVE TYPE: ICD-10-CM

## 2023-10-25 LAB
INFLUENZA A ANTIBODY: NEGATIVE
INFLUENZA B ANTIBODY: NEGATIVE
Lab: NORMAL
QC PASS/FAIL: NORMAL
S PYO AG THROAT QL: NORMAL
SARS-COV-2 RDRP RESP QL NAA+PROBE: NEGATIVE
T4 FREE SERPL-MCNC: 1.42 NG/DL (ref 0.89–1.76)
TSH SERPL DL<=0.005 MIU/L-ACNC: <0.01 UIU/ML (ref 0.27–4.2)

## 2023-10-25 PROCEDURE — 84439 ASSAY OF FREE THYROXINE: CPT

## 2023-10-25 PROCEDURE — 99214 OFFICE O/P EST MOD 30 MIN: CPT | Performed by: FAMILY MEDICINE

## 2023-10-25 PROCEDURE — 36415 COLL VENOUS BLD VENIPUNCTURE: CPT

## 2023-10-25 PROCEDURE — 84443 ASSAY THYROID STIM HORMONE: CPT

## 2023-10-25 RX ORDER — VALACYCLOVIR HYDROCHLORIDE 500 MG/1
500 TABLET, FILM COATED ORAL
Qty: 50 TABLET | Refills: 0 | Status: SHIPPED | OUTPATIENT
Start: 2023-10-25

## 2023-10-25 RX ORDER — GUAIFENESIN 600 MG/1
600 TABLET, EXTENDED RELEASE ORAL 2 TIMES DAILY PRN
Qty: 60 TABLET | Refills: 0 | Status: SHIPPED | OUTPATIENT
Start: 2023-10-25

## 2023-10-25 RX ORDER — DEXTROAMPHETAMINE SACCHARATE, AMPHETAMINE ASPARTATE, DEXTROAMPHETAMINE SULFATE AND AMPHETAMINE SULFATE 7.5; 7.5; 7.5; 7.5 MG/1; MG/1; MG/1; MG/1
30 TABLET ORAL 2 TIMES DAILY
Qty: 60 TABLET | Refills: 0 | Status: SHIPPED | OUTPATIENT
Start: 2023-10-25 | End: 2023-11-24

## 2023-10-25 RX ORDER — ALBUTEROL SULFATE 90 UG/1
2 AEROSOL, METERED RESPIRATORY (INHALATION) 4 TIMES DAILY PRN
Qty: 54 G | Refills: 1 | Status: SHIPPED | OUTPATIENT
Start: 2023-10-25

## 2023-10-25 RX ORDER — LEVOFLOXACIN 500 MG/1
500 TABLET, FILM COATED ORAL DAILY
Qty: 10 TABLET | Refills: 0 | Status: SHIPPED | OUTPATIENT
Start: 2023-10-25 | End: 2023-11-04

## 2023-10-25 NOTE — PROGRESS NOTES
Chief Complaint   Patient presents with    Congestion     X 3 days       Have you seen any other physician or provider since your last visit no    Have you had any other diagnostic tests since your last visit? no    Have you changed or stopped any medications since your last visit? no
Referral To Podiatry      4. Attention deficit hyperactivity disorder (ADHD), predominantly inattentive type  amphetamine-dextroamphetamine (ADDERALL) 30 MG tablet      5. Graves disease  TSH    T4, Free          Orders Placed This Encounter   Medications    valACYclovir (VALTREX) 500 MG tablet     Sig: Take 1 tablet by mouth 5 (five) times a day     Dispense:  50 tablet     Refill:  0    albuterol sulfate HFA (VENTOLIN HFA) 108 (90 Base) MCG/ACT inhaler     Sig: Inhale 2 puffs into the lungs 4 times daily as needed for Wheezing     Dispense:  54 g     Refill:  1    guaiFENesin (MUCINEX) 600 MG extended release tablet     Sig: Take 1 tablet by mouth 2 times daily as needed for Congestion     Dispense:  60 tablet     Refill:  0    levoFLOXacin (LEVAQUIN) 500 MG tablet     Sig: Take 1 tablet by mouth daily for 10 days     Dispense:  10 tablet     Refill:  0    amphetamine-dextroamphetamine (ADDERALL) 30 MG tablet     Sig: Take 1 tablet by mouth 2 times daily for 30 days. Max Daily Amount: 60 mg     Dispense:  60 tablet     Refill:  0        Medications Discontinued During This Encounter   Medication Reason    valACYclovir (VALTREX) 500 MG tablet REORDER    amphetamine-dextroamphetamine (ADDERALL) 30 MG tablet REORDER       Controlled Substances Monitoring:      Please note: This chart was generated using Dragon dictation software. Although every effort was made to ensure the accuracy of this automated transcription, some errors in transcription may have occurred.

## 2023-11-15 ENCOUNTER — TRANSCRIBE ORDERS (OUTPATIENT)
Dept: LAB | Facility: HOSPITAL | Age: 33
End: 2023-11-15
Payer: COMMERCIAL

## 2023-11-15 ENCOUNTER — LAB (OUTPATIENT)
Dept: LAB | Facility: HOSPITAL | Age: 33
End: 2023-11-15
Payer: COMMERCIAL

## 2023-11-15 DIAGNOSIS — E05.00 BASEDOW'S DISEASE: Primary | ICD-10-CM

## 2023-11-15 DIAGNOSIS — E05.00 BASEDOW'S DISEASE: ICD-10-CM

## 2023-11-15 PROCEDURE — 84443 ASSAY THYROID STIM HORMONE: CPT

## 2023-11-15 PROCEDURE — 36415 COLL VENOUS BLD VENIPUNCTURE: CPT

## 2023-11-15 PROCEDURE — 84439 ASSAY OF FREE THYROXINE: CPT

## 2023-11-16 LAB
T4 FREE SERPL-MCNC: 2.64 NG/DL (ref 0.93–1.7)
TSH SERPL DL<=0.05 MIU/L-ACNC: <0.005 UIU/ML (ref 0.27–4.2)

## 2023-12-14 DIAGNOSIS — F90.0 ATTENTION DEFICIT HYPERACTIVITY DISORDER (ADHD), PREDOMINANTLY INATTENTIVE TYPE: ICD-10-CM

## 2023-12-14 RX ORDER — DEXTROAMPHETAMINE SACCHARATE, AMPHETAMINE ASPARTATE, DEXTROAMPHETAMINE SULFATE AND AMPHETAMINE SULFATE 7.5; 7.5; 7.5; 7.5 MG/1; MG/1; MG/1; MG/1
30 TABLET ORAL 2 TIMES DAILY
Qty: 60 TABLET | Refills: 0 | Status: SHIPPED | OUTPATIENT
Start: 2023-12-14 | End: 2024-01-13

## 2023-12-14 NOTE — TELEPHONE ENCOUNTER
Refill request received from pharmacy      Next Office Visit Date:  Future Appointments   Date Time Provider [address]   1/8/2024 10:00 AM Antonette Rodriguez MD [facility] - Please review via PDMP        Last Office Visit:    10/25/2023

## 2024-01-02 ENCOUNTER — HOSPITAL ENCOUNTER (OUTPATIENT)
Facility: HOSPITAL | Age: 34
Discharge: HOME | End: 2024-01-02
Payer: COMMERCIAL

## 2024-01-02 DIAGNOSIS — E05.00 GRAVES DISEASE: Primary | ICD-10-CM

## 2024-01-02 DIAGNOSIS — E05.00 GRAVES DISEASE: ICD-10-CM

## 2024-01-02 LAB
ALBUMIN SERPL-MCNC: 4.2 G/DL (ref 3.4–4.8)
ALBUMIN/GLOB SERPL: 2.1 {RATIO} (ref 0.8–2)
ALP SERPL-CCNC: 50 U/L (ref 25–100)
ALT SERPL-CCNC: 9 U/L (ref 4–36)
ANION GAP SERPL CALCULATED.3IONS-SCNC: 9 MMOL/L (ref 3–16)
AST SERPL-CCNC: 12 U/L (ref 8–33)
BILIRUB SERPL-MCNC: <0.2 MG/DL (ref 0.3–1.2)
BUN SERPL-MCNC: 12 MG/DL (ref 6–20)
CALCIUM SERPL-MCNC: 9.3 MG/DL (ref 8.5–10.5)
CHLORIDE SERPL-SCNC: 108 MMOL/L (ref 98–107)
CO2 SERPL-SCNC: 27 MMOL/L (ref 20–30)
CREAT SERPL-MCNC: 0.6 MG/DL (ref 0.4–1.2)
ERYTHROCYTE [DISTWIDTH] IN BLOOD BY AUTOMATED COUNT: 11.9 % (ref 11–16)
GFR SERPLBLD CREATININE-BSD FMLA CKD-EPI: >60 ML/MIN/{1.73_M2}
GLOBULIN SER CALC-MCNC: 2 G/DL
GLUCOSE SERPL-MCNC: 94 MG/DL (ref 74–106)
HCT VFR BLD AUTO: 40.7 % (ref 37–47)
HGB BLD-MCNC: 13.6 G/DL (ref 11.5–16.5)
MCH RBC QN AUTO: 31.3 PG (ref 27–32)
MCHC RBC AUTO-ENTMCNC: 33.4 G/DL (ref 31–35)
MCV RBC AUTO: 93.6 FL (ref 80–100)
PLATELET # BLD AUTO: 195 K/UL (ref 150–400)
PMV BLD AUTO: 10.2 FL (ref 6–10)
POTASSIUM SERPL-SCNC: 3.7 MMOL/L (ref 3.4–5.1)
PROT SERPL-MCNC: 6.2 G/DL (ref 6.4–8.3)
RBC # BLD AUTO: 4.35 M/UL (ref 3.8–5.8)
SODIUM SERPL-SCNC: 144 MMOL/L (ref 136–145)
T4 FREE SERPL-MCNC: 2.06 NG/DL (ref 0.89–1.76)
TSH SERPL DL<=0.005 MIU/L-ACNC: <0.01 UIU/ML (ref 0.27–4.2)
WBC # BLD AUTO: 5.4 K/UL (ref 4–11)

## 2024-01-02 PROCEDURE — 84439 ASSAY OF FREE THYROXINE: CPT

## 2024-01-02 PROCEDURE — 80053 COMPREHEN METABOLIC PANEL: CPT

## 2024-01-02 PROCEDURE — 85027 COMPLETE CBC AUTOMATED: CPT

## 2024-01-02 PROCEDURE — 36415 COLL VENOUS BLD VENIPUNCTURE: CPT

## 2024-01-02 PROCEDURE — 84443 ASSAY THYROID STIM HORMONE: CPT

## 2024-01-08 ENCOUNTER — OFFICE VISIT (OUTPATIENT)
Dept: FAMILY MEDICINE CLINIC | Age: 34
End: 2024-01-08
Payer: COMMERCIAL

## 2024-01-08 VITALS
TEMPERATURE: 98.1 F | BODY MASS INDEX: 18.59 KG/M2 | OXYGEN SATURATION: 99 % | HEIGHT: 66 IN | SYSTOLIC BLOOD PRESSURE: 114 MMHG | HEART RATE: 93 BPM | DIASTOLIC BLOOD PRESSURE: 60 MMHG | WEIGHT: 115.65 LBS | RESPIRATION RATE: 16 BRPM

## 2024-01-08 DIAGNOSIS — F90.0 ATTENTION DEFICIT HYPERACTIVITY DISORDER (ADHD), PREDOMINANTLY INATTENTIVE TYPE: ICD-10-CM

## 2024-01-08 DIAGNOSIS — E05.00 GRAVES DISEASE: Primary | ICD-10-CM

## 2024-01-08 PROCEDURE — 99214 OFFICE O/P EST MOD 30 MIN: CPT | Performed by: FAMILY MEDICINE

## 2024-01-08 RX ORDER — DEXTROAMPHETAMINE SACCHARATE, AMPHETAMINE ASPARTATE, DEXTROAMPHETAMINE SULFATE AND AMPHETAMINE SULFATE 7.5; 7.5; 7.5; 7.5 MG/1; MG/1; MG/1; MG/1
30 TABLET ORAL 2 TIMES DAILY
Qty: 60 TABLET | Refills: 0 | Status: SHIPPED | OUTPATIENT
Start: 2024-01-08 | End: 2024-02-07

## 2024-01-08 RX ORDER — DEXTROAMPHETAMINE SACCHARATE, AMPHETAMINE ASPARTATE, DEXTROAMPHETAMINE SULFATE AND AMPHETAMINE SULFATE 7.5; 7.5; 7.5; 7.5 MG/1; MG/1; MG/1; MG/1
30 TABLET ORAL 2 TIMES DAILY
Qty: 60 TABLET | Refills: 0 | Status: SHIPPED | OUTPATIENT
Start: 2024-01-08 | End: 2024-01-08 | Stop reason: SDUPTHER

## 2024-01-08 RX ORDER — PROPYLTHIOURACIL 50 MG/1
50 TABLET ORAL 3 TIMES DAILY
Qty: 90 TABLET | Refills: 2 | Status: SHIPPED | OUTPATIENT
Start: 2024-01-08 | End: 2024-04-07

## 2024-01-08 ASSESSMENT — PATIENT HEALTH QUESTIONNAIRE - PHQ9
SUM OF ALL RESPONSES TO PHQ QUESTIONS 1-9: 0
1. LITTLE INTEREST OR PLEASURE IN DOING THINGS: 0
SUM OF ALL RESPONSES TO PHQ9 QUESTIONS 1 & 2: 0
SUM OF ALL RESPONSES TO PHQ QUESTIONS 1-9: 0
2. FEELING DOWN, DEPRESSED OR HOPELESS: 0
SUM OF ALL RESPONSES TO PHQ QUESTIONS 1-9: 0
SUM OF ALL RESPONSES TO PHQ QUESTIONS 1-9: 0

## 2024-01-08 NOTE — PROGRESS NOTES
Chief Complaint   Patient presents with    Other     Graves disease follow up.  Patient recently had labs done.       Have you seen any other physician or provider since your last visit yes -   [facility],   [facility]    Have you had any other diagnostic tests since your last visit? yes - Labs    Have you changed or stopped any medications since your last visit? no

## 2024-01-08 NOTE — PROGRESS NOTES
SUBJECTIVE:    Patient ID: Adelina Mckay is a 33 y.o. female.    Chief Complaint   Patient presents with    Other     Graves disease follow up.  Patient recently had labs done.       HPI: office visit    She is in the office in follow-up of her Graves' disease.  She is doing pretty well with her medicine she thinks.  She feels like maybe the thyroid is off again.  She has had another miscarriage.  She is frustrated with all that.  She really like to have another child.  She is continuing to work.  Trying to take some PTU instead of Tapazole.  Review of Systems   Constitutional:  Positive for fatigue and unexpected weight change.   Endocrine: Positive for cold intolerance and heat intolerance.   Genitourinary:  Positive for frequency.   Psychiatric/Behavioral:  Positive for decreased concentration.    All other systems reviewed and are negative.       OBJECTIVE:  /60   Pulse 93   Temp 98.1 °F (36.7 °C) (Infrared)   Resp 16   Ht 1.676 m (5' 6\")   Wt 52.5 kg (115 lb 10.4 oz)   LMP 10/23/2023 (Approximate) Comment: Patient had a miscarrage December 15th.  SpO2 99% Comment: ra  BMI 18.67 kg/m²    Wt Readings from Last 3 Encounters:   01/08/24 52.5 kg (115 lb 10.4 oz)   10/25/23 51 kg (112 lb 6.4 oz)   07/26/23 51.7 kg (114 lb)     BP Readings from Last 3 Encounters:   01/08/24 114/60   10/25/23 106/68   07/26/23 92/62      Pulse Readings from Last 3 Encounters:   01/08/24 93   10/25/23 92   07/26/23 92     Body mass index is 18.67 kg/m².   Resp Readings from Last 3 Encounters:   01/08/24 16   10/25/23 16   07/26/23 18     Past medical, surgical, family and social history were reviewed and updated with the patient.     Physical Exam  Vitals and nursing note reviewed.   Constitutional:       Appearance: Normal appearance.   HENT:      Head: Normocephalic and atraumatic.      Right Ear: Tympanic membrane, ear canal and external ear normal.      Left Ear: Tympanic membrane, ear canal and external ear

## 2024-02-12 ENCOUNTER — HOSPITAL ENCOUNTER (OUTPATIENT)
Facility: HOSPITAL | Age: 34
Discharge: HOME | End: 2024-02-12
Payer: COMMERCIAL

## 2024-02-12 ENCOUNTER — OFFICE VISIT (OUTPATIENT)
Dept: FAMILY MEDICINE CLINIC | Age: 34
End: 2024-02-12
Payer: COMMERCIAL

## 2024-02-12 VITALS
RESPIRATION RATE: 16 BRPM | HEIGHT: 66 IN | TEMPERATURE: 98.1 F | DIASTOLIC BLOOD PRESSURE: 80 MMHG | WEIGHT: 116.4 LBS | SYSTOLIC BLOOD PRESSURE: 118 MMHG | BODY MASS INDEX: 18.71 KG/M2 | OXYGEN SATURATION: 98 %

## 2024-02-12 DIAGNOSIS — F90.0 ATTENTION DEFICIT HYPERACTIVITY DISORDER (ADHD), PREDOMINANTLY INATTENTIVE TYPE: ICD-10-CM

## 2024-02-12 DIAGNOSIS — F32.A DEPRESSION, UNSPECIFIED DEPRESSION TYPE: Primary | ICD-10-CM

## 2024-02-12 DIAGNOSIS — E05.00 GRAVES DISEASE: ICD-10-CM

## 2024-02-12 DIAGNOSIS — F32.A DEPRESSION, UNSPECIFIED DEPRESSION TYPE: ICD-10-CM

## 2024-02-12 DIAGNOSIS — M62.838 MUSCLE SPASM: ICD-10-CM

## 2024-02-12 LAB
T4 FREE SERPL-MCNC: 1.64 NG/DL (ref 0.89–1.76)
TSH SERPL DL<=0.005 MIU/L-ACNC: <0.01 UIU/ML (ref 0.27–4.2)

## 2024-02-12 PROCEDURE — 36415 COLL VENOUS BLD VENIPUNCTURE: CPT

## 2024-02-12 PROCEDURE — 84439 ASSAY OF FREE THYROXINE: CPT

## 2024-02-12 PROCEDURE — 84443 ASSAY THYROID STIM HORMONE: CPT

## 2024-02-12 PROCEDURE — 99214 OFFICE O/P EST MOD 30 MIN: CPT | Performed by: FAMILY MEDICINE

## 2024-02-12 RX ORDER — DEXTROAMPHETAMINE SACCHARATE, AMPHETAMINE ASPARTATE, DEXTROAMPHETAMINE SULFATE AND AMPHETAMINE SULFATE 7.5; 7.5; 7.5; 7.5 MG/1; MG/1; MG/1; MG/1
30 TABLET ORAL 2 TIMES DAILY
Qty: 60 TABLET | Refills: 0 | Status: SHIPPED | OUTPATIENT
Start: 2024-02-12 | End: 2024-03-13

## 2024-02-12 RX ORDER — CITALOPRAM HYDROBROMIDE 10 MG/1
10 TABLET ORAL DAILY
Qty: 30 TABLET | Refills: 2 | Status: SHIPPED | OUTPATIENT
Start: 2024-02-12

## 2024-02-12 RX ORDER — TIZANIDINE 4 MG/1
4 TABLET ORAL
Qty: 30 TABLET | Refills: 1 | Status: SHIPPED | OUTPATIENT
Start: 2024-02-12

## 2024-02-12 NOTE — PROGRESS NOTES
Chief Complaint   Patient presents with    Insomnia     Per patient she is getting about 4-5 hours at night.  Patient states that she is crying almost daily and thinks that she is depressed.    Graves' Disease     Reg F/U.  Right foot has blisters with clear liquid drainage.       Have you seen any other physician or provider since your last visit yes - [facility]    Have you had any other diagnostic tests since your last visit? no    Have you changed or stopped any medications since your last visit? no
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
## 2024-03-21 NOTE — PATIENT INSTRUCTIONS
Pre-Visit chart review completed. All lab and imaging orders reviewed for outstanding orders and none found. Referrals reviewed and none outstanding. All Health Maintenance requirements reviewed and noted for any that are due at upcoming visit. Any hospital admission documentation, ER documentation or consult notes scanned to chart since last visit have been reviewed and noted for provider to review during upcoming visit.       The medication list included in this document is our record of what you are currently taking, including any changes that were made at today's visit.  If you find any differences when compared to your medications at home, or have any questions that were not answered at your visit, please contact the office.      Keep a list of your medicines with you. List all of the prescription medicines, nonprescription medicines, supplements, natural remedies, and vitamins that you take. Tell your healthcare providers who treat you about all of the products you are taking. Your provider can provide you with a form to keep track of them. Just ask.  Follow the directions that come with your medicine, including information about food or alcohol. Make sure you know how and when to take your medicine. Do not take more or less than you are supposed to take.  Keep all medicines out of the reach of children.  Store medicines according to the directions on the label.  Monitor yourself. Learn to know how your body reacts to your new medicine and keep track of how it makes you feel before attempting (If your provider has allowed you to do so) to drive or go to work.   Seek emergency medical attention if you think you have used too much of this medicine. An overdose of any prescription medicine can be fatal. Overdose symptoms may include extreme drowsiness, muscle weakness, confusion, cold and clammy skin, pinpoint pupils, shallow breathing, slow heart rate, fainting, or coma.  Don't share prescription medicines with

## 2024-03-22 DIAGNOSIS — F90.0 ATTENTION DEFICIT HYPERACTIVITY DISORDER (ADHD), PREDOMINANTLY INATTENTIVE TYPE: ICD-10-CM

## 2024-03-22 RX ORDER — DEXTROAMPHETAMINE SACCHARATE, AMPHETAMINE ASPARTATE, DEXTROAMPHETAMINE SULFATE AND AMPHETAMINE SULFATE 7.5; 7.5; 7.5; 7.5 MG/1; MG/1; MG/1; MG/1
30 TABLET ORAL 2 TIMES DAILY
Qty: 6 TABLET | Refills: 0 | Status: SHIPPED | OUTPATIENT
Start: 2024-03-22 | End: 2024-03-24

## 2024-03-22 RX ORDER — DEXTROAMPHETAMINE SACCHARATE, AMPHETAMINE ASPARTATE, DEXTROAMPHETAMINE SULFATE AND AMPHETAMINE SULFATE 7.5; 7.5; 7.5; 7.5 MG/1; MG/1; MG/1; MG/1
30 TABLET ORAL 2 TIMES DAILY
Qty: 60 TABLET | Refills: 0 | Status: CANCELLED | OUTPATIENT
Start: 2024-03-22 | End: 2024-04-21

## 2024-03-22 NOTE — TELEPHONE ENCOUNTER
Refill request received from pharmacy      Next Office Visit Date:  Future Appointments   Date Time Provider Department Center   3/25/2024 10:15 AM Lamar Renteria MD [facility] - Please review via PDMP        Last Office Visit:    2/12/2024  
2

## 2024-03-24 RX ORDER — DEXTROAMPHETAMINE SACCHARATE, AMPHETAMINE ASPARTATE, DEXTROAMPHETAMINE SULFATE AND AMPHETAMINE SULFATE 7.5; 7.5; 7.5; 7.5 MG/1; MG/1; MG/1; MG/1
30 TABLET ORAL 2 TIMES DAILY
Qty: 60 TABLET | Refills: 0 | Status: SHIPPED | OUTPATIENT
Start: 2024-03-24 | End: 2024-04-23

## 2024-03-25 ENCOUNTER — OFFICE VISIT (OUTPATIENT)
Dept: FAMILY MEDICINE CLINIC | Age: 34
End: 2024-03-25
Payer: COMMERCIAL

## 2024-03-25 ENCOUNTER — HOSPITAL ENCOUNTER (OUTPATIENT)
Facility: HOSPITAL | Age: 34
Discharge: HOME | End: 2024-03-25
Payer: COMMERCIAL

## 2024-03-25 VITALS
OXYGEN SATURATION: 98 % | TEMPERATURE: 97.8 F | DIASTOLIC BLOOD PRESSURE: 76 MMHG | RESPIRATION RATE: 18 BRPM | BODY MASS INDEX: 19.01 KG/M2 | WEIGHT: 117.8 LBS | SYSTOLIC BLOOD PRESSURE: 112 MMHG | HEART RATE: 80 BPM

## 2024-03-25 DIAGNOSIS — F32.A DEPRESSION, UNSPECIFIED DEPRESSION TYPE: ICD-10-CM

## 2024-03-25 DIAGNOSIS — M62.838 MUSCLE SPASM: ICD-10-CM

## 2024-03-25 DIAGNOSIS — E05.90 HYPERTHYROIDISM: ICD-10-CM

## 2024-03-25 DIAGNOSIS — F90.0 ATTENTION DEFICIT HYPERACTIVITY DISORDER (ADHD), PREDOMINANTLY INATTENTIVE TYPE: ICD-10-CM

## 2024-03-25 DIAGNOSIS — M54.2 NECK PAIN: Primary | ICD-10-CM

## 2024-03-25 DIAGNOSIS — R53.82 CHRONIC FATIGUE: ICD-10-CM

## 2024-03-25 LAB
T4 FREE SERPL-MCNC: 0.73 NG/DL (ref 0.89–1.76)
TSH SERPL DL<=0.005 MIU/L-ACNC: 0.05 UIU/ML (ref 0.27–4.2)

## 2024-03-25 PROCEDURE — 36415 COLL VENOUS BLD VENIPUNCTURE: CPT

## 2024-03-25 PROCEDURE — 84439 ASSAY OF FREE THYROXINE: CPT

## 2024-03-25 PROCEDURE — 99214 OFFICE O/P EST MOD 30 MIN: CPT | Performed by: FAMILY MEDICINE

## 2024-03-25 PROCEDURE — 84443 ASSAY THYROID STIM HORMONE: CPT

## 2024-03-25 RX ORDER — DEXTROAMPHETAMINE SACCHARATE, AMPHETAMINE ASPARTATE, DEXTROAMPHETAMINE SULFATE AND AMPHETAMINE SULFATE 7.5; 7.5; 7.5; 7.5 MG/1; MG/1; MG/1; MG/1
30 TABLET ORAL 2 TIMES DAILY
Qty: 60 TABLET | Refills: 0 | Status: SHIPPED | OUTPATIENT
Start: 2024-03-25 | End: 2024-03-25 | Stop reason: SDUPTHER

## 2024-03-25 RX ORDER — TIZANIDINE 4 MG/1
4 TABLET ORAL
Qty: 30 TABLET | Refills: 2 | Status: SHIPPED | OUTPATIENT
Start: 2024-03-25

## 2024-03-25 RX ORDER — CITALOPRAM HYDROBROMIDE 10 MG/1
10 TABLET ORAL DAILY
Qty: 30 TABLET | Refills: 2 | Status: SHIPPED | OUTPATIENT
Start: 2024-03-25

## 2024-03-25 RX ORDER — GABAPENTIN 100 MG/1
100 CAPSULE ORAL NIGHTLY
Qty: 30 CAPSULE | Refills: 2 | Status: SHIPPED | OUTPATIENT
Start: 2024-03-25 | End: 2024-06-23

## 2024-03-25 RX ORDER — DEXTROAMPHETAMINE SACCHARATE, AMPHETAMINE ASPARTATE, DEXTROAMPHETAMINE SULFATE AND AMPHETAMINE SULFATE 7.5; 7.5; 7.5; 7.5 MG/1; MG/1; MG/1; MG/1
30 TABLET ORAL 2 TIMES DAILY
Qty: 60 TABLET | Refills: 0 | Status: SHIPPED | OUTPATIENT
Start: 2024-03-25 | End: 2024-03-27 | Stop reason: SDUPTHER

## 2024-03-25 RX ORDER — PROPYLTHIOURACIL 50 MG/1
50 TABLET ORAL 3 TIMES DAILY
Qty: 90 TABLET | Refills: 2 | Status: SHIPPED | OUTPATIENT
Start: 2024-03-25 | End: 2024-06-23

## 2024-03-25 NOTE — PROGRESS NOTES
Chief Complaint   Patient presents with    Depression    1 Month Follow-Up    Medication Refill     Patient states that she is here today for a one month follow up for medication refills.    Health Maintenance:     (Please select all that apply and \"pend\" the order to the provider.)    Are any of the following due at the time of this visit?   Colonoscopy No   Mammogram No   Annual Wellness Visit No     HgbA1C No  Microalbumin No   Diabetic Eye Exam No   Diabetic Foot Exam No    Are any of the following due at the time of this visit?   UDS No   Medication Contract No    Have you seen any other physician or provider since your last visit?  No  Have you had any other diagnostic tests since your last visit?   No  Have you changed or stopped any medications since your last visit?   No      
Diagnosis Orders   1. Neck pain  gabapentin (NEURONTIN) 100 MG capsule      2. Attention deficit hyperactivity disorder (ADHD), predominantly inattentive type  DISCONTINUED: amphetamine-dextroamphetamine (ADDERALL) 30 MG tablet    DISCONTINUED: amphetamine-dextroamphetamine (ADDERALL) 30 MG tablet      3. Depression, unspecified depression type  citalopram (CELEXA) 10 MG tablet      4. Muscle spasm  tiZANidine (ZANAFLEX) 4 MG tablet    gabapentin (NEURONTIN) 100 MG capsule      5. Chronic fatigue        6. Hyperthyroidism  propylthiouracil (PTU) 50 MG tablet    TSH    T4, Free          Orders Placed This Encounter   Medications    DISCONTD: amphetamine-dextroamphetamine (ADDERALL) 30 MG tablet     Sig: Take 1 tablet by mouth 2 times daily for 30 days. Max Daily Amount: 60 mg     Dispense:  60 tablet     Refill:  0    citalopram (CELEXA) 10 MG tablet     Sig: Take 1 tablet by mouth daily     Dispense:  30 tablet     Refill:  2    propylthiouracil (PTU) 50 MG tablet     Sig: Take 1 tablet by mouth 3 times daily Name brand medically necessary     Dispense:  90 tablet     Refill:  2    tiZANidine (ZANAFLEX) 4 MG tablet     Sig: Take 1 tablet by mouth nightly     Dispense:  30 tablet     Refill:  2    gabapentin (NEURONTIN) 100 MG capsule     Sig: Take 1 capsule by mouth at bedtime for 90 days. Max Daily Amount: 100 mg     Dispense:  30 capsule     Refill:  2    DISCONTD: amphetamine-dextroamphetamine (ADDERALL) 30 MG tablet     Sig: Take 1 tablet by mouth 2 times daily for 30 days. To be filled on or after April 22nd Max Daily Amount: 60 mg     Dispense:  60 tablet     Refill:  0        Medications Discontinued During This Encounter   Medication Reason    propylthiouracil (PTU) 50 MG tablet REORDER    tiZANidine (ZANAFLEX) 4 MG tablet REORDER    citalopram (CELEXA) 10 MG tablet REORDER    amphetamine-dextroamphetamine (ADDERALL) 30 MG tablet REORDER    amphetamine-dextroamphetamine (ADDERALL) 30 MG tablet REORDER

## 2024-03-27 ENCOUNTER — PATIENT MESSAGE (OUTPATIENT)
Dept: FAMILY MEDICINE CLINIC | Age: 34
End: 2024-03-27

## 2024-03-27 DIAGNOSIS — F90.0 ATTENTION DEFICIT HYPERACTIVITY DISORDER (ADHD), PREDOMINANTLY INATTENTIVE TYPE: ICD-10-CM

## 2024-03-27 RX ORDER — DEXTROAMPHETAMINE SACCHARATE, AMPHETAMINE ASPARTATE, DEXTROAMPHETAMINE SULFATE AND AMPHETAMINE SULFATE 7.5; 7.5; 7.5; 7.5 MG/1; MG/1; MG/1; MG/1
30 TABLET ORAL 2 TIMES DAILY
Qty: 60 TABLET | Refills: 0 | OUTPATIENT
Start: 2024-03-27 | End: 2024-04-26

## 2024-03-27 RX ORDER — DEXTROAMPHETAMINE SACCHARATE, AMPHETAMINE ASPARTATE, DEXTROAMPHETAMINE SULFATE AND AMPHETAMINE SULFATE 7.5; 7.5; 7.5; 7.5 MG/1; MG/1; MG/1; MG/1
30 TABLET ORAL 2 TIMES DAILY
Qty: 60 TABLET | Refills: 0 | Status: SHIPPED | OUTPATIENT
Start: 2024-03-27 | End: 2024-04-26

## 2024-03-27 NOTE — TELEPHONE ENCOUNTER
Needs to go to [facility] instead of [facility].    Size Of Lesion In Cm (Optional): 0 Detail Level: Generalized

## 2024-03-27 NOTE — TELEPHONE ENCOUNTER
From: Adelina Mckay  To: Dr. Lamar Renteria  Sent: 3/27/2024 3:50 PM EDT  Subject: Adderall refill    [facility] said they didn't receive the prescription yesterday. Can you resend it?

## 2024-04-01 DIAGNOSIS — F90.0 ATTENTION DEFICIT HYPERACTIVITY DISORDER (ADHD), PREDOMINANTLY INATTENTIVE TYPE: ICD-10-CM

## 2024-04-01 RX ORDER — DEXTROAMPHETAMINE SACCHARATE, AMPHETAMINE ASPARTATE, DEXTROAMPHETAMINE SULFATE AND AMPHETAMINE SULFATE 7.5; 7.5; 7.5; 7.5 MG/1; MG/1; MG/1; MG/1
30 TABLET ORAL 2 TIMES DAILY
Qty: 60 TABLET | Refills: 0 | Status: SHIPPED | OUTPATIENT
Start: 2024-04-01 | End: 2024-05-01

## 2024-04-03 DIAGNOSIS — E05.90 HYPERTHYROIDISM: ICD-10-CM

## 2024-04-03 RX ORDER — PROPYLTHIOURACIL 50 MG/1
50 TABLET ORAL 3 TIMES DAILY
Qty: 90 TABLET | Refills: 2 | OUTPATIENT
Start: 2024-04-03

## 2024-04-14 DIAGNOSIS — M62.838 MUSCLE SPASM: ICD-10-CM

## 2024-04-15 RX ORDER — TIZANIDINE 4 MG/1
4 TABLET ORAL NIGHTLY
Qty: 30 TABLET | Refills: 2 | Status: SHIPPED | OUTPATIENT
Start: 2024-04-15

## 2024-04-29 DIAGNOSIS — M54.2 NECK PAIN: ICD-10-CM

## 2024-04-29 DIAGNOSIS — M62.838 MUSCLE SPASM: ICD-10-CM

## 2024-04-30 DIAGNOSIS — M62.838 MUSCLE SPASM: ICD-10-CM

## 2024-04-30 DIAGNOSIS — M54.2 NECK PAIN: ICD-10-CM

## 2024-04-30 RX ORDER — GABAPENTIN 100 MG/1
100 CAPSULE ORAL NIGHTLY
Qty: 30 CAPSULE | Refills: 2 | OUTPATIENT
Start: 2024-04-30 | End: 2024-07-29

## 2024-05-01 DIAGNOSIS — M62.838 MUSCLE SPASM: ICD-10-CM

## 2024-05-01 DIAGNOSIS — M54.2 NECK PAIN: ICD-10-CM

## 2024-05-01 RX ORDER — GABAPENTIN 100 MG/1
100 CAPSULE ORAL NIGHTLY
Qty: 30 CAPSULE | Refills: 2 | OUTPATIENT
Start: 2024-05-01 | End: 2024-07-30

## 2024-05-02 ENCOUNTER — PATIENT MESSAGE (OUTPATIENT)
Dept: FAMILY MEDICINE CLINIC | Age: 34
End: 2024-05-02

## 2024-05-02 DIAGNOSIS — F90.0 ATTENTION DEFICIT HYPERACTIVITY DISORDER (ADHD), PREDOMINANTLY INATTENTIVE TYPE: ICD-10-CM

## 2024-05-02 RX ORDER — DEXTROAMPHETAMINE SACCHARATE, AMPHETAMINE ASPARTATE, DEXTROAMPHETAMINE SULFATE AND AMPHETAMINE SULFATE 7.5; 7.5; 7.5; 7.5 MG/1; MG/1; MG/1; MG/1
30 TABLET ORAL 2 TIMES DAILY
Qty: 60 TABLET | Refills: 0 | OUTPATIENT
Start: 2024-05-02 | End: 2024-06-01

## 2024-05-02 RX ORDER — GABAPENTIN 100 MG/1
100 CAPSULE ORAL NIGHTLY
Qty: 30 CAPSULE | Refills: 2 | Status: SHIPPED | OUTPATIENT
Start: 2024-05-02 | End: 2024-07-31

## 2024-05-02 NOTE — TELEPHONE ENCOUNTER
From: Adelina Mckay  To: Dr. Lamar Renteria  Sent: 5/2/2024 9:14 AM EDT  Subject: Adderall    MyChart won't let me request a refill on Adderall. It was last filled on 4/1/24 at [facility] in [place].

## 2024-05-09 DIAGNOSIS — F32.A DEPRESSION, UNSPECIFIED DEPRESSION TYPE: ICD-10-CM

## 2024-05-09 RX ORDER — CITALOPRAM HYDROBROMIDE 10 MG/1
10 TABLET ORAL DAILY
Qty: 90 TABLET | Refills: 1 | Status: SHIPPED | OUTPATIENT
Start: 2024-05-09

## 2024-06-04 DIAGNOSIS — F90.0 ATTENTION DEFICIT HYPERACTIVITY DISORDER (ADHD), PREDOMINANTLY INATTENTIVE TYPE: ICD-10-CM

## 2024-06-05 ENCOUNTER — PATIENT MESSAGE (OUTPATIENT)
Dept: FAMILY MEDICINE CLINIC | Age: 34
End: 2024-06-05

## 2024-06-05 RX ORDER — DEXTROAMPHETAMINE SACCHARATE, AMPHETAMINE ASPARTATE, DEXTROAMPHETAMINE SULFATE AND AMPHETAMINE SULFATE 7.5; 7.5; 7.5; 7.5 MG/1; MG/1; MG/1; MG/1
30 TABLET ORAL 2 TIMES DAILY
Qty: 60 TABLET | Refills: 0 | OUTPATIENT
Start: 2024-06-05 | End: 2024-07-05

## 2024-06-05 NOTE — TELEPHONE ENCOUNTER
Refill request received from pharmacy      Next Office Visit Date:  Future Appointments   Date Time Provider Department Center   6/24/2024  9:30 AM Lamar Renteria MD [facility] - Please review via PDMP        Last Office Visit:    10/25/2023

## 2024-06-05 NOTE — TELEPHONE ENCOUNTER
From: Adelina Mckay  To: Dr. Lamar Renteria  Sent: 6/5/2024 12:03 PM EDT  Subject: Adderall refill    My refill is due tomorrow, 6/6. MyChart won't let me request a refill because it's one day early. [facility] and it takes a few days to get.

## 2024-06-10 DIAGNOSIS — F90.0 ATTENTION DEFICIT HYPERACTIVITY DISORDER (ADHD), PREDOMINANTLY INATTENTIVE TYPE: ICD-10-CM

## 2024-06-10 NOTE — TELEPHONE ENCOUNTER
For some reason it was pulling her last office visit as 10/23. She was actually seen in March.               Refill request received from patient      Next Office Visit Date:  Future Appointments   Date Time Provider Department Center   6/24/2024  9:30 AM Lamar Renteria MD [facility] - Please review via PDMP        Last Office Visit:    3/25/24

## 2024-06-10 NOTE — TELEPHONE ENCOUNTER
Refill request received from patient      Next Office Visit Date:  Future Appointments   Date Time Provider Department Center   6/24/2024  9:30 AM Lamar Renteria MD [facility] - Please review via PDMP        Last Office Visit:    10/25/2023

## 2024-06-13 RX ORDER — DEXTROAMPHETAMINE SACCHARATE, AMPHETAMINE ASPARTATE, DEXTROAMPHETAMINE SULFATE AND AMPHETAMINE SULFATE 7.5; 7.5; 7.5; 7.5 MG/1; MG/1; MG/1; MG/1
30 TABLET ORAL 2 TIMES DAILY
Qty: 60 TABLET | Refills: 0 | Status: SHIPPED | OUTPATIENT
Start: 2024-06-13 | End: 2024-07-13

## 2024-06-24 ENCOUNTER — HOSPITAL ENCOUNTER (OUTPATIENT)
Facility: HOSPITAL | Age: 34
Discharge: HOME | End: 2024-06-24
Payer: COMMERCIAL

## 2024-06-24 ENCOUNTER — OFFICE VISIT (OUTPATIENT)
Dept: FAMILY MEDICINE CLINIC | Age: 34
End: 2024-06-24
Payer: COMMERCIAL

## 2024-06-24 VITALS
SYSTOLIC BLOOD PRESSURE: 122 MMHG | RESPIRATION RATE: 16 BRPM | HEART RATE: 105 BPM | OXYGEN SATURATION: 98 % | DIASTOLIC BLOOD PRESSURE: 70 MMHG | TEMPERATURE: 97.9 F | WEIGHT: 116.2 LBS | HEIGHT: 66 IN | BODY MASS INDEX: 18.68 KG/M2

## 2024-06-24 DIAGNOSIS — M54.42 CHRONIC MIDLINE LOW BACK PAIN WITH BILATERAL SCIATICA: ICD-10-CM

## 2024-06-24 DIAGNOSIS — F90.0 ATTENTION DEFICIT HYPERACTIVITY DISORDER (ADHD), PREDOMINANTLY INATTENTIVE TYPE: ICD-10-CM

## 2024-06-24 DIAGNOSIS — M54.41 CHRONIC MIDLINE LOW BACK PAIN WITH BILATERAL SCIATICA: ICD-10-CM

## 2024-06-24 DIAGNOSIS — G89.29 CHRONIC MIDLINE LOW BACK PAIN WITH BILATERAL SCIATICA: ICD-10-CM

## 2024-06-24 DIAGNOSIS — E05.90 HYPERTHYROIDISM: ICD-10-CM

## 2024-06-24 DIAGNOSIS — M54.2 NECK PAIN: Primary | ICD-10-CM

## 2024-06-24 DIAGNOSIS — M62.838 MUSCLE SPASM: ICD-10-CM

## 2024-06-24 LAB
T4 FREE SERPL-MCNC: 1.19 NG/DL (ref 0.92–1.68)
TSH SERPL DL<=0.005 MIU/L-ACNC: <0.01 UIU/ML (ref 0.27–4.2)

## 2024-06-24 PROCEDURE — 84439 ASSAY OF FREE THYROXINE: CPT

## 2024-06-24 PROCEDURE — 84443 ASSAY THYROID STIM HORMONE: CPT

## 2024-06-24 PROCEDURE — 99214 OFFICE O/P EST MOD 30 MIN: CPT | Performed by: FAMILY MEDICINE

## 2024-06-24 PROCEDURE — 36415 COLL VENOUS BLD VENIPUNCTURE: CPT

## 2024-06-24 RX ORDER — GABAPENTIN 100 MG/1
100 CAPSULE ORAL NIGHTLY
Qty: 30 CAPSULE | Refills: 2 | Status: SHIPPED | OUTPATIENT
Start: 2024-06-24 | End: 2024-09-22

## 2024-06-24 RX ORDER — DEXTROAMPHETAMINE SACCHARATE, AMPHETAMINE ASPARTATE, DEXTROAMPHETAMINE SULFATE AND AMPHETAMINE SULFATE 7.5; 7.5; 7.5; 7.5 MG/1; MG/1; MG/1; MG/1
30 TABLET ORAL 2 TIMES DAILY
Qty: 60 TABLET | Refills: 0 | Status: SHIPPED | OUTPATIENT
Start: 2024-06-24 | End: 2024-07-24

## 2024-06-24 RX ORDER — TIZANIDINE 4 MG/1
4 TABLET ORAL NIGHTLY
Qty: 30 TABLET | Refills: 2 | Status: SHIPPED | OUTPATIENT
Start: 2024-06-24

## 2024-06-24 RX ORDER — DEXTROAMPHETAMINE SACCHARATE, AMPHETAMINE ASPARTATE, DEXTROAMPHETAMINE SULFATE AND AMPHETAMINE SULFATE 7.5; 7.5; 7.5; 7.5 MG/1; MG/1; MG/1; MG/1
30 TABLET ORAL 2 TIMES DAILY
Qty: 60 TABLET | Refills: 0 | Status: SHIPPED | OUTPATIENT
Start: 2024-06-24 | End: 2024-06-24 | Stop reason: SDUPTHER

## 2024-06-24 SDOH — ECONOMIC STABILITY: FOOD INSECURITY: WITHIN THE PAST 12 MONTHS, THE FOOD YOU BOUGHT JUST DIDN'T LAST AND YOU DIDN'T HAVE MONEY TO GET MORE.: NEVER TRUE

## 2024-06-24 SDOH — ECONOMIC STABILITY: FOOD INSECURITY: WITHIN THE PAST 12 MONTHS, YOU WORRIED THAT YOUR FOOD WOULD RUN OUT BEFORE YOU GOT MONEY TO BUY MORE.: NEVER TRUE

## 2024-06-24 SDOH — ECONOMIC STABILITY: TRANSPORTATION INSECURITY
IN THE PAST 12 MONTHS, HAS LACK OF TRANSPORTATION KEPT YOU FROM MEETINGS, WORK, OR FROM GETTING THINGS NEEDED FOR DAILY LIVING?: YES

## 2024-06-24 SDOH — ECONOMIC STABILITY: INCOME INSECURITY: HOW HARD IS IT FOR YOU TO PAY FOR THE VERY BASICS LIKE FOOD, HOUSING, MEDICAL CARE, AND HEATING?: NOT HARD AT ALL

## 2024-06-24 NOTE — PROGRESS NOTES
SUBJECTIVE:    Patient ID: Adelina Mckay is a 33 y.o. female.    Chief Complaint   Patient presents with    Graves' Disease     Follow up       HPI: office visit  She is in the office today in follow-up of her Graves' disease.  She does feel like her thyroid scan a little off.  She has been concerned about whether she should take the same dose of her medicine.  She is not having any chest pain.  She denies any shortness of breath.  She is not having any irregular.  She did go see the spinal specialist who did look at her MRI.  I did tell her that she had some bulging disc.  He did not offer her any other help.  She is still having a lot of pain.  She still having a lot of difficulty with her daily function.  She says sitting still is just an absolutely no.  She had to go on a plane for work and that was absolutely miserable.  She has been taking the gabapentin which does help her sleep.  She is still struggling somewhat with her focus but her medication does help.  She does feel like she does relatively well at work with that.  She has not had any falls or injuries.  She has not had any other new issues.  Review of Systems   Constitutional:  Positive for fatigue.   Endocrine: Positive for cold intolerance and heat intolerance.   Genitourinary:  Positive for vaginal bleeding. Negative for frequency.   Musculoskeletal:  Positive for arthralgias, myalgias, neck pain and neck stiffness.   Psychiatric/Behavioral:  Positive for decreased concentration, dysphoric mood and sleep disturbance. The patient is nervous/anxious.    All other systems reviewed and are negative.       OBJECTIVE:  /70   Pulse (!) 105   Temp 97.9 °F (36.6 °C) (Infrared)   Resp 16   Ht 1.676 m (5' 6\")   Wt 52.7 kg (116 lb 3.2 oz)   SpO2 98%   BMI 18.76 kg/m²    Wt Readings from Last 3 Encounters:   06/24/24 52.7 kg (116 lb 3.2 oz)   03/25/24 53.4 kg (117 lb 12.8 oz)   02/12/24 52.8 kg (116 lb 6.4 oz)     BP Readings from Last 3

## 2024-06-24 NOTE — PROGRESS NOTES
Chief Complaint   Patient presents with    Graves' Disease     Follow up       Have you seen any other physician or provider since your last visit yes - Neuro Specialist Dr. Dean Curtis    Have you had any other diagnostic tests since your last visit? no    Have you changed or stopped any medications since your last visit? no

## 2024-07-02 DIAGNOSIS — E05.00 GRAVES DISEASE: Primary | ICD-10-CM

## 2024-07-08 DIAGNOSIS — L30.1 DYSHIDROTIC FOOT DERMATITIS: Primary | ICD-10-CM

## 2024-07-08 RX ORDER — FLUCONAZOLE 100 MG/1
100 TABLET ORAL DAILY
Qty: 7 TABLET | Refills: 0 | Status: SHIPPED | OUTPATIENT
Start: 2024-07-08 | End: 2024-07-15

## 2024-07-08 RX ORDER — METHIMAZOLE 5 MG/1
5 TABLET ORAL 3 TIMES DAILY
Qty: 90 TABLET | Refills: 2 | Status: SHIPPED | OUTPATIENT
Start: 2024-07-08 | End: 2024-08-07

## 2024-07-15 DIAGNOSIS — M62.838 MUSCLE SPASM: ICD-10-CM

## 2024-07-16 RX ORDER — TIZANIDINE 4 MG/1
4 TABLET ORAL NIGHTLY
Qty: 30 TABLET | Refills: 2 | Status: SHIPPED | OUTPATIENT
Start: 2024-07-16

## 2024-07-16 NOTE — TELEPHONE ENCOUNTER
Refill request received from pharmacy      Next Office Visit Date:  Future Appointments   Date Time Provider Department Center   9/23/2024  9:45 AM Lamar Renteria MD [facility] - Please review via PDMP        Last Office Visit:    6/24/2024

## 2024-07-31 DIAGNOSIS — M54.2 NECK PAIN: ICD-10-CM

## 2024-07-31 DIAGNOSIS — M62.838 MUSCLE SPASM: ICD-10-CM

## 2024-08-01 RX ORDER — GABAPENTIN 100 MG/1
100 CAPSULE ORAL NIGHTLY
Qty: 30 CAPSULE | Refills: 2 | OUTPATIENT
Start: 2024-08-01 | End: 2024-10-30

## 2024-08-01 NOTE — TELEPHONE ENCOUNTER
Refill request received from pharmacy      Next Office Visit Date:  Future Appointments   Date Time Provider Department Center   9/23/2024  9:45 AM [facility] Please review via PDMP        Last Office Visit:    6/24/2024

## 2024-08-14 ENCOUNTER — PATIENT MESSAGE (OUTPATIENT)
Dept: FAMILY MEDICINE CLINIC | Age: 34
End: 2024-08-14

## 2024-08-14 DIAGNOSIS — F90.0 ATTENTION DEFICIT HYPERACTIVITY DISORDER (ADHD), PREDOMINANTLY INATTENTIVE TYPE: ICD-10-CM

## 2024-08-15 DIAGNOSIS — B37.9 YEAST INFECTION: Primary | ICD-10-CM

## 2024-08-15 RX ORDER — DEXTROAMPHETAMINE SACCHARATE, AMPHETAMINE ASPARTATE, DEXTROAMPHETAMINE SULFATE AND AMPHETAMINE SULFATE 7.5; 7.5; 7.5; 7.5 MG/1; MG/1; MG/1; MG/1
30 TABLET ORAL 2 TIMES DAILY
Qty: 60 TABLET | Refills: 0 | OUTPATIENT
Start: 2024-08-15 | End: 2024-09-14

## 2024-08-15 RX ORDER — FLUCONAZOLE 150 MG/1
150 TABLET ORAL DAILY
Qty: 3 TABLET | Refills: 0 | Status: SHIPPED | OUTPATIENT
Start: 2024-08-15 | End: 2024-08-18

## 2024-08-17 DIAGNOSIS — F90.0 ATTENTION DEFICIT HYPERACTIVITY DISORDER (ADHD), PREDOMINANTLY INATTENTIVE TYPE: ICD-10-CM

## 2024-08-17 RX ORDER — DEXTROAMPHETAMINE SACCHARATE, AMPHETAMINE ASPARTATE, DEXTROAMPHETAMINE SULFATE AND AMPHETAMINE SULFATE 7.5; 7.5; 7.5; 7.5 MG/1; MG/1; MG/1; MG/1
30 TABLET ORAL 2 TIMES DAILY
Qty: 60 TABLET | Refills: 0 | Status: CANCELLED | OUTPATIENT
Start: 2024-08-17 | End: 2024-09-16

## 2024-08-19 NOTE — TELEPHONE ENCOUNTER
Refill request received from pharmacy      Next Office Visit Date:  Future Appointments   Date Time Provider Department Center   9/23/2024  9:45 AM [facility] - Please review via PDMP        Last Office Visit:    6/24/2024

## 2024-08-20 ENCOUNTER — PATIENT MESSAGE (OUTPATIENT)
Dept: FAMILY MEDICINE CLINIC | Age: 34
End: 2024-08-20

## 2024-08-20 DIAGNOSIS — F90.0 ATTENTION DEFICIT HYPERACTIVITY DISORDER (ADHD), PREDOMINANTLY INATTENTIVE TYPE: ICD-10-CM

## 2024-08-21 RX ORDER — DEXTROAMPHETAMINE SACCHARATE, AMPHETAMINE ASPARTATE, DEXTROAMPHETAMINE SULFATE AND AMPHETAMINE SULFATE 7.5; 7.5; 7.5; 7.5 MG/1; MG/1; MG/1; MG/1
30 TABLET ORAL 2 TIMES DAILY
Qty: 8 TABLET | Refills: 0 | Status: SHIPPED | OUTPATIENT
Start: 2024-08-21 | End: 2024-08-25

## 2024-08-21 NOTE — TELEPHONE ENCOUNTER
Spoke with pharmacy patient has had and still has a prescription on hold. Informed patient that if this continues to be a problem with her medications maybe she should consider a new pharmacy.

## 2024-09-22 DIAGNOSIS — F90.0 ATTENTION DEFICIT HYPERACTIVITY DISORDER (ADHD), PREDOMINANTLY INATTENTIVE TYPE: ICD-10-CM

## 2024-09-23 ENCOUNTER — HOSPITAL ENCOUNTER (OUTPATIENT)
Facility: HOSPITAL | Age: 34
Discharge: HOME | End: 2024-09-23
Payer: COMMERCIAL

## 2024-09-23 ENCOUNTER — OFFICE VISIT (OUTPATIENT)
Dept: FAMILY MEDICINE CLINIC | Age: 34
End: 2024-09-23
Payer: COMMERCIAL

## 2024-09-23 VITALS
BODY MASS INDEX: 17.61 KG/M2 | OXYGEN SATURATION: 99 % | SYSTOLIC BLOOD PRESSURE: 122 MMHG | HEART RATE: 111 BPM | HEIGHT: 66 IN | DIASTOLIC BLOOD PRESSURE: 80 MMHG | WEIGHT: 109.6 LBS | RESPIRATION RATE: 16 BRPM | TEMPERATURE: 99.4 F

## 2024-09-23 DIAGNOSIS — R31.9 URINARY TRACT INFECTION WITH HEMATURIA, SITE UNSPECIFIED: ICD-10-CM

## 2024-09-23 DIAGNOSIS — N39.0 URINARY TRACT INFECTION WITHOUT HEMATURIA, SITE UNSPECIFIED: Primary | ICD-10-CM

## 2024-09-23 DIAGNOSIS — M54.2 NECK PAIN: ICD-10-CM

## 2024-09-23 DIAGNOSIS — N39.0 URINARY TRACT INFECTION WITHOUT HEMATURIA, SITE UNSPECIFIED: ICD-10-CM

## 2024-09-23 DIAGNOSIS — F90.0 ATTENTION DEFICIT HYPERACTIVITY DISORDER (ADHD), PREDOMINANTLY INATTENTIVE TYPE: ICD-10-CM

## 2024-09-23 DIAGNOSIS — N76.0 BACTERIAL VAGINOSIS: ICD-10-CM

## 2024-09-23 DIAGNOSIS — B96.89 BACTERIAL VAGINOSIS: ICD-10-CM

## 2024-09-23 DIAGNOSIS — N39.0 URINARY TRACT INFECTION WITH HEMATURIA, SITE UNSPECIFIED: ICD-10-CM

## 2024-09-23 DIAGNOSIS — E05.90 HYPERTHYROIDISM: ICD-10-CM

## 2024-09-23 DIAGNOSIS — M62.838 MUSCLE SPASM: ICD-10-CM

## 2024-09-23 LAB
ALBUMIN SERPL-MCNC: 4.1 G/DL (ref 3.4–4.8)
ALBUMIN/GLOB SERPL: 1.6 {RATIO} (ref 0.8–2)
ALP SERPL-CCNC: 63 U/L (ref 25–100)
ALT SERPL-CCNC: 12 U/L (ref 4–36)
ANION GAP SERPL CALCULATED.3IONS-SCNC: 11 MMOL/L (ref 3–16)
APPEARANCE FLUID: CLEAR
AST SERPL-CCNC: 15 U/L (ref 8–33)
BILIRUB SERPL-MCNC: 0.3 MG/DL (ref 0.3–1.2)
BILIRUBIN, POC: NORMAL
BLOOD URINE, POC: NORMAL
BUN SERPL-MCNC: 10 MG/DL (ref 6–20)
CALCIUM SERPL-MCNC: 9.6 MG/DL (ref 8.5–10.5)
CHLORIDE SERPL-SCNC: 104 MMOL/L (ref 98–107)
CLARITY, POC: CLEAR
CO2 SERPL-SCNC: 26 MMOL/L (ref 20–30)
COLOR, POC: YELLOW
CREAT SERPL-MCNC: 0.6 MG/DL (ref 0.4–1.2)
GFR SERPLBLD CREATININE-BSD FMLA CKD-EPI: >90 ML/MIN/{1.73_M2}
GLOBULIN SER CALC-MCNC: 2.5 G/DL
GLUCOSE SERPL-MCNC: 88 MG/DL (ref 74–106)
GLUCOSE URINE, POC: NORMAL MG/DL
KETONES, POC: NORMAL MG/DL
LEUKOCYTE EST, POC: NORMAL
NITRITE, POC: POSITIVE
PH, POC: 5.5
POTASSIUM SERPL-SCNC: 4.1 MMOL/L (ref 3.4–5.1)
PROT SERPL-MCNC: 6.6 G/DL (ref 6.4–8.3)
PROTEIN, POC: NORMAL MG/DL
SODIUM SERPL-SCNC: 141 MMOL/L (ref 136–145)
SPECIFIC GRAVITY, POC: >=1.03
T4 FREE SERPL-MCNC: 2.48 NG/DL (ref 0.92–1.68)
TSH SERPL DL<=0.005 MIU/L-ACNC: <0.01 UIU/ML (ref 0.27–4.2)
UROBILINOGEN, POC: NORMAL MG/DL

## 2024-09-23 PROCEDURE — 87186 SC STD MICRODIL/AGAR DIL: CPT

## 2024-09-23 PROCEDURE — 99214 OFFICE O/P EST MOD 30 MIN: CPT | Performed by: FAMILY MEDICINE

## 2024-09-23 PROCEDURE — 87086 URINE CULTURE/COLONY COUNT: CPT

## 2024-09-23 PROCEDURE — 80053 COMPREHEN METABOLIC PANEL: CPT

## 2024-09-23 PROCEDURE — 81002 URINALYSIS NONAUTO W/O SCOPE: CPT | Performed by: FAMILY MEDICINE

## 2024-09-23 PROCEDURE — 36415 COLL VENOUS BLD VENIPUNCTURE: CPT

## 2024-09-23 PROCEDURE — 84439 ASSAY OF FREE THYROXINE: CPT

## 2024-09-23 PROCEDURE — 87088 URINE BACTERIA CULTURE: CPT

## 2024-09-23 PROCEDURE — 84443 ASSAY THYROID STIM HORMONE: CPT

## 2024-09-23 RX ORDER — DEXTROAMPHETAMINE SACCHARATE, AMPHETAMINE ASPARTATE, DEXTROAMPHETAMINE SULFATE AND AMPHETAMINE SULFATE 7.5; 7.5; 7.5; 7.5 MG/1; MG/1; MG/1; MG/1
30 TABLET ORAL 2 TIMES DAILY
Qty: 60 TABLET | Refills: 0 | Status: SHIPPED | OUTPATIENT
Start: 2024-09-23 | End: 2024-09-23 | Stop reason: SDUPTHER

## 2024-09-23 RX ORDER — METRONIDAZOLE 500 MG/1
500 TABLET ORAL 2 TIMES DAILY
Qty: 14 TABLET | Refills: 0 | Status: SHIPPED | OUTPATIENT
Start: 2024-09-23 | End: 2024-09-30

## 2024-09-23 RX ORDER — GABAPENTIN 100 MG/1
100 CAPSULE ORAL NIGHTLY
Qty: 90 CAPSULE | Refills: 1 | Status: SHIPPED | OUTPATIENT
Start: 2024-09-23 | End: 2025-03-22

## 2024-09-23 RX ORDER — METHIMAZOLE 5 MG/1
5 TABLET ORAL 3 TIMES DAILY
Qty: 90 TABLET | Refills: 2 | Status: SHIPPED | OUTPATIENT
Start: 2024-09-23 | End: 2024-12-22

## 2024-09-23 RX ORDER — DEXTROAMPHETAMINE SACCHARATE, AMPHETAMINE ASPARTATE, DEXTROAMPHETAMINE SULFATE AND AMPHETAMINE SULFATE 7.5; 7.5; 7.5; 7.5 MG/1; MG/1; MG/1; MG/1
30 TABLET ORAL 2 TIMES DAILY
Qty: 60 TABLET | Refills: 0 | Status: SHIPPED | OUTPATIENT
Start: 2024-09-23 | End: 2024-10-23

## 2024-09-24 RX ORDER — DEXTROAMPHETAMINE SACCHARATE, AMPHETAMINE ASPARTATE, DEXTROAMPHETAMINE SULFATE AND AMPHETAMINE SULFATE 7.5; 7.5; 7.5; 7.5 MG/1; MG/1; MG/1; MG/1
30 TABLET ORAL 2 TIMES DAILY
Qty: 8 TABLET | Refills: 0 | OUTPATIENT
Start: 2024-09-24 | End: 2024-09-28

## 2024-09-25 RX ORDER — CIPROFLOXACIN 500 MG/1
500 TABLET, FILM COATED ORAL 2 TIMES DAILY
Qty: 14 TABLET | Refills: 0 | Status: SHIPPED | OUTPATIENT
Start: 2024-09-25 | End: 2024-09-27

## 2024-09-26 LAB
BACTERIA UR CULT: ABNORMAL
ORGANISM: ABNORMAL

## 2024-09-26 RX ORDER — CEFDINIR 300 MG/1
300 CAPSULE ORAL 2 TIMES DAILY
Qty: 20 CAPSULE | Refills: 0 | Status: SHIPPED | OUTPATIENT
Start: 2024-09-26 | End: 2024-10-06

## 2024-10-22 ENCOUNTER — PATIENT MESSAGE (OUTPATIENT)
Dept: FAMILY MEDICINE CLINIC | Age: 34
End: 2024-10-22

## 2024-10-22 DIAGNOSIS — F90.0 ATTENTION DEFICIT HYPERACTIVITY DISORDER (ADHD), PREDOMINANTLY INATTENTIVE TYPE: ICD-10-CM

## 2024-10-22 RX ORDER — DEXTROAMPHETAMINE SACCHARATE, AMPHETAMINE ASPARTATE, DEXTROAMPHETAMINE SULFATE AND AMPHETAMINE SULFATE 7.5; 7.5; 7.5; 7.5 MG/1; MG/1; MG/1; MG/1
30 TABLET ORAL 2 TIMES DAILY
Qty: 60 TABLET | Refills: 0 | OUTPATIENT
Start: 2024-10-22 | End: 2024-11-21

## 2024-11-24 DIAGNOSIS — F90.0 ATTENTION DEFICIT HYPERACTIVITY DISORDER (ADHD), PREDOMINANTLY INATTENTIVE TYPE: ICD-10-CM

## 2024-11-24 DIAGNOSIS — M62.838 MUSCLE SPASM: ICD-10-CM

## 2024-11-25 RX ORDER — DEXTROAMPHETAMINE SACCHARATE, AMPHETAMINE ASPARTATE, DEXTROAMPHETAMINE SULFATE AND AMPHETAMINE SULFATE 7.5; 7.5; 7.5; 7.5 MG/1; MG/1; MG/1; MG/1
30 TABLET ORAL 2 TIMES DAILY
Qty: 60 TABLET | Refills: 0 | Status: SHIPPED | OUTPATIENT
Start: 2024-11-25 | End: 2024-12-25

## 2024-12-09 ENCOUNTER — HOSPITAL ENCOUNTER (OUTPATIENT)
Facility: HOSPITAL | Age: 34
Discharge: HOME | End: 2024-12-09
Payer: COMMERCIAL

## 2024-12-09 ENCOUNTER — OFFICE VISIT (OUTPATIENT)
Age: 34
End: 2024-12-09
Payer: COMMERCIAL

## 2024-12-09 VITALS
TEMPERATURE: 98.6 F | OXYGEN SATURATION: 96 % | HEIGHT: 66 IN | WEIGHT: 116.6 LBS | BODY MASS INDEX: 18.74 KG/M2 | RESPIRATION RATE: 16 BRPM | HEART RATE: 96 BPM | DIASTOLIC BLOOD PRESSURE: 70 MMHG | SYSTOLIC BLOOD PRESSURE: 114 MMHG

## 2024-12-09 DIAGNOSIS — E05.90 HYPERTHYROIDISM: ICD-10-CM

## 2024-12-09 DIAGNOSIS — F90.0 ATTENTION DEFICIT HYPERACTIVITY DISORDER (ADHD), PREDOMINANTLY INATTENTIVE TYPE: ICD-10-CM

## 2024-12-09 DIAGNOSIS — E05.00 GRAVES DISEASE: ICD-10-CM

## 2024-12-09 DIAGNOSIS — M62.838 MUSCLE SPASM: ICD-10-CM

## 2024-12-09 DIAGNOSIS — M54.2 NECK PAIN: ICD-10-CM

## 2024-12-09 DIAGNOSIS — E05.00 GRAVES DISEASE: Primary | ICD-10-CM

## 2024-12-09 LAB
T4 FREE SERPL-MCNC: 1.3 NG/DL (ref 0.92–1.68)
TSH SERPL DL<=0.005 MIU/L-ACNC: <0.01 UIU/ML (ref 0.27–4.2)

## 2024-12-09 PROCEDURE — 84443 ASSAY THYROID STIM HORMONE: CPT

## 2024-12-09 PROCEDURE — 99214 OFFICE O/P EST MOD 30 MIN: CPT | Performed by: FAMILY MEDICINE

## 2024-12-09 PROCEDURE — 84480 ASSAY TRIIODOTHYRONINE (T3): CPT

## 2024-12-09 PROCEDURE — 36415 COLL VENOUS BLD VENIPUNCTURE: CPT

## 2024-12-09 PROCEDURE — 84439 ASSAY OF FREE THYROXINE: CPT

## 2024-12-09 RX ORDER — GABAPENTIN 100 MG/1
100 CAPSULE ORAL NIGHTLY
Qty: 90 CAPSULE | Refills: 1 | Status: SHIPPED | OUTPATIENT
Start: 2024-12-09 | End: 2025-06-07

## 2024-12-09 RX ORDER — DEXTROAMPHETAMINE SACCHARATE, AMPHETAMINE ASPARTATE, DEXTROAMPHETAMINE SULFATE AND AMPHETAMINE SULFATE 7.5; 7.5; 7.5; 7.5 MG/1; MG/1; MG/1; MG/1
30 TABLET ORAL 2 TIMES DAILY
Qty: 60 TABLET | Refills: 0 | Status: SHIPPED | OUTPATIENT
Start: 2024-12-09 | End: 2025-01-08

## 2024-12-09 RX ORDER — VALACYCLOVIR HYDROCHLORIDE 500 MG/1
500 TABLET, FILM COATED ORAL
Qty: 50 TABLET | Refills: 1 | Status: SHIPPED | OUTPATIENT
Start: 2024-12-09

## 2024-12-09 RX ORDER — METHIMAZOLE 5 MG/1
5 TABLET ORAL 3 TIMES DAILY
Qty: 90 TABLET | Refills: 2 | Status: SHIPPED | OUTPATIENT
Start: 2024-12-09 | End: 2025-03-09

## 2024-12-09 NOTE — PROGRESS NOTES
surgical, family and social history were reviewed and updated with the patient.     Physical Exam  Vitals and nursing note reviewed.   Constitutional:       Appearance: Normal appearance.   HENT:      Head: Normocephalic and atraumatic.      Right Ear: Tympanic membrane, ear canal and external ear normal.      Left Ear: Tympanic membrane, ear canal and external ear normal.      Nose: Nose normal.      Mouth/Throat:      Mouth: Mucous membranes are moist.      Pharynx: Oropharynx is clear.   Eyes:      Extraocular Movements: Extraocular movements intact.      Conjunctiva/sclera: Conjunctivae normal.      Pupils: Pupils are equal, round, and reactive to light.   Cardiovascular:      Rate and Rhythm: Normal rate and regular rhythm.      Pulses: Normal pulses.      Heart sounds: Normal heart sounds.   Pulmonary:      Effort: Pulmonary effort is normal.      Breath sounds: Normal breath sounds.   Abdominal:      General: Bowel sounds are normal.      Palpations: Abdomen is soft.   Musculoskeletal:         General: Normal range of motion.      Cervical back: Normal range of motion and neck supple.   Skin:     General: Skin is warm and dry.      Capillary Refill: Capillary refill takes less than 2 seconds.   Neurological:      General: No focal deficit present.      Mental Status: She is alert and oriented to person, place, and time.   Psychiatric:         Mood and Affect: Mood normal.         Behavior: Behavior normal.          No results found for requested labs within last 30 days.     Hemoglobin A1C (%)   Date Value   03/29/2023 5.3       Lab Results   Component Value Date/Time    WBC 5.4 01/02/2024 09:06 AM    NEUTROABS 3.3 03/29/2023 02:35 PM    HGB 13.6 01/02/2024 09:06 AM    HCT 40.7 01/02/2024 09:06 AM    MCV 93.6 01/02/2024 09:06 AM     01/02/2024 09:06 AM     Lab Results   Component Value Date    TSH <0.01 (L) 12/09/2024       ASSESSMENT/PLAN    Diagnosis Orders   1. Graves disease  T4, Free    TSH    T3

## 2024-12-10 LAB — T3 SERPL-MCNC: 1.87 NG/ML (ref 0.8–2)

## 2024-12-27 DIAGNOSIS — F90.0 ATTENTION DEFICIT HYPERACTIVITY DISORDER (ADHD), PREDOMINANTLY INATTENTIVE TYPE: ICD-10-CM

## 2024-12-30 RX ORDER — DEXTROAMPHETAMINE SACCHARATE, AMPHETAMINE ASPARTATE, DEXTROAMPHETAMINE SULFATE AND AMPHETAMINE SULFATE 7.5; 7.5; 7.5; 7.5 MG/1; MG/1; MG/1; MG/1
30 TABLET ORAL 2 TIMES DAILY
Qty: 60 TABLET | Refills: 0 | Status: SHIPPED | OUTPATIENT
Start: 2024-12-30 | End: 2025-01-29

## 2025-01-29 DIAGNOSIS — F90.0 ATTENTION DEFICIT HYPERACTIVITY DISORDER (ADHD), PREDOMINANTLY INATTENTIVE TYPE: ICD-10-CM

## 2025-01-31 RX ORDER — DEXTROAMPHETAMINE SACCHARATE, AMPHETAMINE ASPARTATE, DEXTROAMPHETAMINE SULFATE AND AMPHETAMINE SULFATE 7.5; 7.5; 7.5; 7.5 MG/1; MG/1; MG/1; MG/1
30 TABLET ORAL 2 TIMES DAILY
Qty: 60 TABLET | Refills: 0 | Status: SHIPPED | OUTPATIENT
Start: 2025-01-31 | End: 2025-03-02

## 2025-02-27 DIAGNOSIS — F90.0 ATTENTION DEFICIT HYPERACTIVITY DISORDER (ADHD), PREDOMINANTLY INATTENTIVE TYPE: ICD-10-CM

## 2025-02-27 DIAGNOSIS — M62.838 MUSCLE SPASM: ICD-10-CM

## 2025-02-27 DIAGNOSIS — M54.2 NECK PAIN: ICD-10-CM

## 2025-02-27 RX ORDER — GABAPENTIN 100 MG/1
CAPSULE ORAL
Qty: 30 CAPSULE | Refills: 2 | Status: SHIPPED | OUTPATIENT
Start: 2025-02-27 | End: 2025-03-27

## 2025-02-27 RX ORDER — DEXTROAMPHETAMINE SACCHARATE, AMPHETAMINE ASPARTATE, DEXTROAMPHETAMINE SULFATE AND AMPHETAMINE SULFATE 7.5; 7.5; 7.5; 7.5 MG/1; MG/1; MG/1; MG/1
TABLET ORAL
Qty: 60 TABLET | Refills: 0 | Status: SHIPPED | OUTPATIENT
Start: 2025-02-27 | End: 2025-03-29

## 2025-02-27 NOTE — TELEPHONE ENCOUNTER
Refill request received from pharmacy      Next Office Visit Date:  Future Appointments   Date Time Provider Department Center   3/10/2025 10:00 AM [facility] - Please review via PDMP        Last Office Visit:    12/9/2024

## 2025-02-27 NOTE — TELEPHONE ENCOUNTER
Refill request received from pharmacy      Next Office Visit Date:  Future Appointments   Date Time Provider Department Center   3/10/2025 10:00 AM [facility] - Please review via PDMP        Last Office Visit:    12/9/2024

## 2025-03-10 ENCOUNTER — HOSPITAL ENCOUNTER (OUTPATIENT)
Facility: HOSPITAL | Age: 35
Discharge: HOME | End: 2025-03-10
Payer: COMMERCIAL

## 2025-03-10 ENCOUNTER — OFFICE VISIT (OUTPATIENT)
Age: 35
End: 2025-03-10
Payer: COMMERCIAL

## 2025-03-10 VITALS
OXYGEN SATURATION: 97 % | DIASTOLIC BLOOD PRESSURE: 68 MMHG | RESPIRATION RATE: 18 BRPM | HEART RATE: 93 BPM | TEMPERATURE: 98.2 F | BODY MASS INDEX: 19.06 KG/M2 | HEIGHT: 66 IN | WEIGHT: 118.6 LBS | SYSTOLIC BLOOD PRESSURE: 114 MMHG

## 2025-03-10 DIAGNOSIS — G47.00 INSOMNIA, UNSPECIFIED TYPE: Primary | ICD-10-CM

## 2025-03-10 DIAGNOSIS — E05.90 HYPERTHYROIDISM: ICD-10-CM

## 2025-03-10 DIAGNOSIS — F90.0 ATTENTION DEFICIT HYPERACTIVITY DISORDER (ADHD), PREDOMINANTLY INATTENTIVE TYPE: ICD-10-CM

## 2025-03-10 DIAGNOSIS — L60.3 BRITTLE NAILS: ICD-10-CM

## 2025-03-10 DIAGNOSIS — B35.1 TOENAIL FUNGUS: ICD-10-CM

## 2025-03-10 DIAGNOSIS — L30.1 DYSHIDROTIC FOOT DERMATITIS: ICD-10-CM

## 2025-03-10 DIAGNOSIS — E05.00 GRAVES DISEASE: ICD-10-CM

## 2025-03-10 DIAGNOSIS — M54.2 NECK PAIN: ICD-10-CM

## 2025-03-10 LAB
T4 FREE SERPL-MCNC: 1.7 NG/DL (ref 0.92–1.68)
TSH SERPL DL<=0.005 MIU/L-ACNC: <0.01 UIU/ML (ref 0.27–4.2)

## 2025-03-10 PROCEDURE — 36415 COLL VENOUS BLD VENIPUNCTURE: CPT

## 2025-03-10 PROCEDURE — 99214 OFFICE O/P EST MOD 30 MIN: CPT | Performed by: FAMILY MEDICINE

## 2025-03-10 PROCEDURE — 84481 FREE ASSAY (FT-3): CPT

## 2025-03-10 PROCEDURE — 84439 ASSAY OF FREE THYROXINE: CPT

## 2025-03-10 PROCEDURE — 84443 ASSAY THYROID STIM HORMONE: CPT

## 2025-03-10 RX ORDER — DEXTROAMPHETAMINE SACCHARATE, AMPHETAMINE ASPARTATE, DEXTROAMPHETAMINE SULFATE AND AMPHETAMINE SULFATE 7.5; 7.5; 7.5; 7.5 MG/1; MG/1; MG/1; MG/1
30 TABLET ORAL 2 TIMES DAILY
Qty: 60 TABLET | Refills: 0 | Status: SHIPPED | OUTPATIENT
Start: 2025-03-10 | End: 2025-04-09

## 2025-03-10 RX ORDER — CLOTRIMAZOLE AND BETAMETHASONE DIPROPIONATE 10; .64 MG/G; MG/G
CREAM TOPICAL
Qty: 45 G | Refills: 5 | Status: SHIPPED | OUTPATIENT
Start: 2025-03-10

## 2025-03-10 RX ORDER — GRISEOFULVIN 125 MG/1
250 TABLET ORAL DAILY
Qty: 30 TABLET | Refills: 1 | Status: SHIPPED | OUTPATIENT
Start: 2025-03-10 | End: 2025-05-09

## 2025-03-10 SDOH — ECONOMIC STABILITY: FOOD INSECURITY: WITHIN THE PAST 12 MONTHS, THE FOOD YOU BOUGHT JUST DIDN'T LAST AND YOU DIDN'T HAVE MONEY TO GET MORE.: NEVER TRUE

## 2025-03-10 SDOH — ECONOMIC STABILITY: FOOD INSECURITY: WITHIN THE PAST 12 MONTHS, YOU WORRIED THAT YOUR FOOD WOULD RUN OUT BEFORE YOU GOT MONEY TO BUY MORE.: NEVER TRUE

## 2025-03-10 ASSESSMENT — PATIENT HEALTH QUESTIONNAIRE - PHQ9
6. FEELING BAD ABOUT YOURSELF - OR THAT YOU ARE A FAILURE OR HAVE LET YOURSELF OR YOUR FAMILY DOWN: NOT AT ALL
1. LITTLE INTEREST OR PLEASURE IN DOING THINGS: NOT AT ALL
SUM OF ALL RESPONSES TO PHQ QUESTIONS 1-9: 0
7. TROUBLE CONCENTRATING ON THINGS, SUCH AS READING THE NEWSPAPER OR WATCHING TELEVISION: NOT AT ALL
SUM OF ALL RESPONSES TO PHQ QUESTIONS 1-9: 0
2. FEELING DOWN, DEPRESSED OR HOPELESS: NOT AT ALL
5. POOR APPETITE OR OVEREATING: NOT AT ALL
3. TROUBLE FALLING OR STAYING ASLEEP: NOT AT ALL
8. MOVING OR SPEAKING SO SLOWLY THAT OTHER PEOPLE COULD HAVE NOTICED. OR THE OPPOSITE, BEING SO FIGETY OR RESTLESS THAT YOU HAVE BEEN MOVING AROUND A LOT MORE THAN USUAL: NOT AT ALL
9. THOUGHTS THAT YOU WOULD BE BETTER OFF DEAD, OR OF HURTING YOURSELF: NOT AT ALL
SUM OF ALL RESPONSES TO PHQ QUESTIONS 1-9: 0
4. FEELING TIRED OR HAVING LITTLE ENERGY: NOT AT ALL
SUM OF ALL RESPONSES TO PHQ QUESTIONS 1-9: 0

## 2025-03-10 NOTE — PROGRESS NOTES
SUBJECTIVE:    Patient ID: Adelina Mckay is a 34 y.o. female.    Chief Complaint   Patient presents with    Hypothyroidism     Reg f/u       HPI: office visit  History of Present Illness  The patient presents for evaluation of hair loss, foot infection, insomnia, back and neck pain, and emotional instability.    She reports experiencing unusual symptoms, including significant hair loss and brittle hair that easily breaks off. Additionally, she notes similar brittleness in her fingernails. These symptoms have been present for the past month.    She describes an ongoing issue with her right foot, which she inadvertently caused to bleed during a shower due to itching. The condition appears to be spreading to her other toes, including under the toenails. She has attempted various treatments for this issue, including nystatin/triamcinolone cream, but none have been effective. The problem consistently originates from her pinky toe.    She also reports difficulty initiating sleep, often taking over an hour to fall asleep. Despite experimenting with her Adderall dosage, she has not observed any changes in her sleep pattern. She typically takes her first dose of Adderall at 7:30 AM and the second dose no later than noon. Once asleep, she finds it challenging to wake up, often sleeping through alarms. She experiences temperature fluctuations during sleep, alternating between feeling cold and sweating. She uses an electric blanket to maintain warmth but often wakes up drenched in sweat. She has a history of hot flashes, which have previously been managed with medication, but currently experiences both heat and cold sensitivity. She frequently feels the need to reapply deodorant throughout the day.    She has been experiencing increased back and neck pain over the past month, with no identifiable cause. She has not had any falls or changes in her activity level.    She reports a resurgence of emotional symptoms, including

## 2025-03-10 NOTE — PROGRESS NOTES
Chief Complaint   Patient presents with    Hypothyroidism     Reg f/u       Have you seen any other physician or provider since your last visit no    Have you had any other diagnostic tests since your last visit? no    Have you changed or stopped any medications since your last visit? no

## 2025-03-11 LAB — T3FREE SERPL-MCNC: 5.8 PG/ML (ref 2.3–4.2)

## 2025-04-25 DIAGNOSIS — F90.0 ATTENTION DEFICIT HYPERACTIVITY DISORDER (ADHD), PREDOMINANTLY INATTENTIVE TYPE: ICD-10-CM

## 2025-04-25 RX ORDER — DEXTROAMPHETAMINE SACCHARATE, AMPHETAMINE ASPARTATE, DEXTROAMPHETAMINE SULFATE AND AMPHETAMINE SULFATE 7.5; 7.5; 7.5; 7.5 MG/1; MG/1; MG/1; MG/1
TABLET ORAL
Qty: 60 TABLET | Refills: 0 | Status: SHIPPED | OUTPATIENT
Start: 2025-04-25 | End: 2025-05-25

## 2025-04-25 NOTE — TELEPHONE ENCOUNTER
Refill request received from pharmacy      Next Office Visit Date:  Future Appointments   Date Time Provider Department Center   5/12/2025  9:30 AM [facility] - Please review via PDMP        Last Office Visit:    3/10/2025

## 2025-05-12 ENCOUNTER — OFFICE VISIT (OUTPATIENT)
Age: 35
End: 2025-05-12
Payer: COMMERCIAL

## 2025-05-12 ENCOUNTER — HOSPITAL ENCOUNTER (OUTPATIENT)
Facility: HOSPITAL | Age: 35
Discharge: HOME | End: 2025-05-12
Payer: COMMERCIAL

## 2025-05-12 VITALS
DIASTOLIC BLOOD PRESSURE: 80 MMHG | SYSTOLIC BLOOD PRESSURE: 116 MMHG | TEMPERATURE: 98.4 F | RESPIRATION RATE: 16 BRPM | HEIGHT: 66 IN | HEART RATE: 99 BPM | BODY MASS INDEX: 18.71 KG/M2 | WEIGHT: 116.4 LBS | OXYGEN SATURATION: 99 %

## 2025-05-12 DIAGNOSIS — E05.90 HYPERTHYROIDISM: ICD-10-CM

## 2025-05-12 DIAGNOSIS — F90.0 ATTENTION DEFICIT HYPERACTIVITY DISORDER (ADHD), PREDOMINANTLY INATTENTIVE TYPE: ICD-10-CM

## 2025-05-12 DIAGNOSIS — R53.82 CHRONIC FATIGUE: ICD-10-CM

## 2025-05-12 DIAGNOSIS — Z13.220 SCREENING, LIPID: ICD-10-CM

## 2025-05-12 DIAGNOSIS — K04.7 TOOTH ABSCESS: ICD-10-CM

## 2025-05-12 DIAGNOSIS — M62.838 MUSCLE SPASM: ICD-10-CM

## 2025-05-12 DIAGNOSIS — M54.2 NECK PAIN: ICD-10-CM

## 2025-05-12 DIAGNOSIS — K04.7 TOOTH ABSCESS: Primary | ICD-10-CM

## 2025-05-12 LAB
ALBUMIN SERPL-MCNC: 4.5 G/DL (ref 3.4–4.8)
ALBUMIN/GLOB SERPL: 1.5 {RATIO} (ref 0.8–2)
ALP SERPL-CCNC: 68 U/L (ref 25–100)
ALT SERPL-CCNC: 18 U/L (ref 4–36)
ANION GAP SERPL CALCULATED.3IONS-SCNC: 12 MMOL/L (ref 3–16)
AST SERPL-CCNC: 20 U/L (ref 8–33)
BILIRUB SERPL-MCNC: 0.3 MG/DL (ref 0.3–1.2)
BUN SERPL-MCNC: 14 MG/DL (ref 6–20)
CALCIUM SERPL-MCNC: 9.7 MG/DL (ref 8.3–10.6)
CHLORIDE SERPL-SCNC: 100 MMOL/L (ref 98–107)
CHOLEST SERPL-MCNC: 213 MG/DL (ref 0–200)
CO2 SERPL-SCNC: 27 MMOL/L (ref 20–30)
CREAT SERPL-MCNC: 0.7 MG/DL (ref 0.4–1.2)
ERYTHROCYTE [DISTWIDTH] IN BLOOD BY AUTOMATED COUNT: 12 % (ref 11–16)
GFR SERPLBLD CREATININE-BSD FMLA CKD-EPI: >90 ML/MIN/{1.73_M2}
GLOBULIN SER CALC-MCNC: 3 G/DL
GLUCOSE SERPL-MCNC: 69 MG/DL (ref 74–106)
HCT VFR BLD AUTO: 42.9 % (ref 37–47)
HDLC SERPL-MCNC: 46 MG/DL (ref 40–60)
HGB BLD-MCNC: 14.1 G/DL (ref 11.5–16.5)
LDLC SERPL CALC-MCNC: 149 MG/DL
MCH RBC QN AUTO: 32.3 PG (ref 27–32)
MCHC RBC AUTO-ENTMCNC: 32.9 G/DL (ref 31–35)
MCV RBC AUTO: 98.4 FL (ref 80–100)
PLATELET # BLD AUTO: 254 K/UL (ref 150–400)
PMV BLD AUTO: 10.3 FL (ref 6–10)
POTASSIUM SERPL-SCNC: 3.7 MMOL/L (ref 3.4–5.1)
PROT SERPL-MCNC: 7.5 G/DL (ref 6.4–8.3)
RBC # BLD AUTO: 4.36 M/UL (ref 3.8–5.8)
SODIUM SERPL-SCNC: 139 MMOL/L (ref 136–145)
T4 FREE SERPL-MCNC: 0.7 NG/DL (ref 0.92–1.68)
TRIGL SERPL-MCNC: 91 MG/DL (ref 0–249)
TSH SERPL DL<=0.005 MIU/L-ACNC: 7.72 UIU/ML (ref 0.27–4.2)
VLDLC SERPL CALC-MCNC: 18 MG/DL
WBC # BLD AUTO: 8 K/UL (ref 4–11)

## 2025-05-12 PROCEDURE — 84480 ASSAY TRIIODOTHYRONINE (T3): CPT

## 2025-05-12 PROCEDURE — 36415 COLL VENOUS BLD VENIPUNCTURE: CPT

## 2025-05-12 PROCEDURE — 85027 COMPLETE CBC AUTOMATED: CPT

## 2025-05-12 PROCEDURE — 84439 ASSAY OF FREE THYROXINE: CPT

## 2025-05-12 PROCEDURE — 84443 ASSAY THYROID STIM HORMONE: CPT

## 2025-05-12 PROCEDURE — 99214 OFFICE O/P EST MOD 30 MIN: CPT | Performed by: FAMILY MEDICINE

## 2025-05-12 PROCEDURE — 96372 THER/PROPH/DIAG INJ SC/IM: CPT | Performed by: FAMILY MEDICINE

## 2025-05-12 PROCEDURE — 80053 COMPREHEN METABOLIC PANEL: CPT

## 2025-05-12 PROCEDURE — 80061 LIPID PANEL: CPT

## 2025-05-12 RX ORDER — GABAPENTIN 100 MG/1
100 CAPSULE ORAL NIGHTLY
Qty: 30 CAPSULE | Refills: 2 | Status: SHIPPED | OUTPATIENT
Start: 2025-05-12 | End: 2025-08-10

## 2025-05-12 RX ORDER — METHIMAZOLE 5 MG/1
5 TABLET ORAL 3 TIMES DAILY
Qty: 90 TABLET | Refills: 2 | Status: SHIPPED | OUTPATIENT
Start: 2025-05-12 | End: 2025-05-16

## 2025-05-12 RX ORDER — METHYLPREDNISOLONE 4 MG/1
4 TABLET ORAL
COMMUNITY
Start: 2025-05-02

## 2025-05-12 RX ORDER — KETOROLAC TROMETHAMINE 30 MG/ML
60 INJECTION, SOLUTION INTRAMUSCULAR; INTRAVENOUS ONCE
Status: COMPLETED | OUTPATIENT
Start: 2025-05-12 | End: 2025-05-12

## 2025-05-12 RX ORDER — OXYCODONE HYDROCHLORIDE 10 MG/1
10 TABLET ORAL EVERY 6 HOURS PRN
Qty: 28 TABLET | Refills: 0 | Status: SHIPPED | OUTPATIENT
Start: 2025-05-12 | End: 2025-05-19

## 2025-05-12 RX ORDER — HYDROCODONE BITARTRATE AND ACETAMINOPHEN 7.5; 325 MG/1; MG/1
2 TABLET ORAL EVERY 6 HOURS PRN
COMMUNITY
Start: 2025-05-09 | End: 2025-05-12

## 2025-05-12 RX ORDER — DEXTROAMPHETAMINE SACCHARATE, AMPHETAMINE ASPARTATE, DEXTROAMPHETAMINE SULFATE AND AMPHETAMINE SULFATE 7.5; 7.5; 7.5; 7.5 MG/1; MG/1; MG/1; MG/1
30 TABLET ORAL DAILY
Qty: 60 TABLET | Refills: 0 | Status: SHIPPED | OUTPATIENT
Start: 2025-05-12 | End: 2025-07-11

## 2025-05-12 RX ORDER — AMOXICILLIN 875 MG/1
875 TABLET, COATED ORAL EVERY 12 HOURS
COMMUNITY
Start: 2025-05-02 | End: 2025-05-12

## 2025-05-12 RX ORDER — OXYCODONE HYDROCHLORIDE 10 MG/1
10 TABLET ORAL EVERY 6 HOURS PRN
Qty: 28 TABLET | Refills: 0 | Status: SHIPPED | OUTPATIENT
Start: 2025-05-12 | End: 2025-05-12 | Stop reason: SDUPTHER

## 2025-05-12 RX ADMIN — KETOROLAC TROMETHAMINE 60 MG: 30 INJECTION, SOLUTION INTRAMUSCULAR; INTRAVENOUS at 11:13

## 2025-05-12 NOTE — PROGRESS NOTES
Have you been to the ER, urgent care clinic since your last visit?  Hospitalized since your last visit?   NO    Have you seen or consulted any other health care providers outside our system since your last visit?   Yes Dentist in [place] had teeth pulled Dr. Barton     “Have you had a pap smear?”    NO    No cervical cancer screening on file    Administrations This Visit       ketorolac (TORADOL) injection 60 mg       Admin Date  05/12/2025  11:13 Action  Given Dose  60 mg Route  IntraMUSCular Site  Dorsogluteal Left Documented By  Lakesha Dong MA    NDC: 25690-625-65    : CIBDO    Patient Supplied?: No                 Patient tolerated injection well. Patient advised to wait 20 minutes in the office following the injection. No signs/symptoms of reaction noted after 20 minutes.

## 2025-05-13 ENCOUNTER — RESULTS FOLLOW-UP (OUTPATIENT)
Age: 35
End: 2025-05-13

## 2025-05-13 LAB — T3 SERPL-MCNC: 1.04 NG/ML (ref 0.8–2)

## 2025-05-16 RX ORDER — METHIMAZOLE 10 MG/1
10 TABLET ORAL 3 TIMES DAILY
Qty: 90 TABLET | Refills: 2 | Status: SHIPPED | OUTPATIENT
Start: 2025-05-16 | End: 2025-08-14

## 2025-06-18 DIAGNOSIS — F90.0 ATTENTION DEFICIT HYPERACTIVITY DISORDER (ADHD), PREDOMINANTLY INATTENTIVE TYPE: ICD-10-CM

## 2025-06-18 DIAGNOSIS — M62.838 MUSCLE SPASM: ICD-10-CM

## 2025-06-18 RX ORDER — DEXTROAMPHETAMINE SACCHARATE, AMPHETAMINE ASPARTATE, DEXTROAMPHETAMINE SULFATE AND AMPHETAMINE SULFATE 7.5; 7.5; 7.5; 7.5 MG/1; MG/1; MG/1; MG/1
TABLET ORAL
Qty: 60 TABLET | Refills: 0 | Status: SHIPPED | OUTPATIENT
Start: 2025-06-18 | End: 2025-07-18

## 2025-06-18 NOTE — TELEPHONE ENCOUNTER
Refill request received from pharmacy      Next Office Visit Date:  Future Appointments   Date Time Provider Department Center   7/14/2025  9:15 AM [facility] - Please review via PDMP        Last Office Visit:    5/12/2025

## 2025-07-14 ENCOUNTER — HOSPITAL ENCOUNTER (OUTPATIENT)
Facility: HOSPITAL | Age: 35
Discharge: HOME | End: 2025-07-14
Payer: COMMERCIAL

## 2025-07-14 ENCOUNTER — OFFICE VISIT (OUTPATIENT)
Age: 35
End: 2025-07-14
Payer: COMMERCIAL

## 2025-07-14 VITALS
SYSTOLIC BLOOD PRESSURE: 104 MMHG | TEMPERATURE: 98.2 F | DIASTOLIC BLOOD PRESSURE: 70 MMHG | RESPIRATION RATE: 16 BRPM | HEIGHT: 66 IN | WEIGHT: 115.8 LBS | BODY MASS INDEX: 18.61 KG/M2 | HEART RATE: 89 BPM | OXYGEN SATURATION: 99 %

## 2025-07-14 DIAGNOSIS — E05.00 GRAVES DISEASE: ICD-10-CM

## 2025-07-14 DIAGNOSIS — E05.00 GRAVES DISEASE: Primary | ICD-10-CM

## 2025-07-14 LAB
T4 FREE SERPL-MCNC: 1.6 NG/DL (ref 0.92–1.68)
TSH SERPL DL<=0.005 MIU/L-ACNC: <0.01 UIU/ML (ref 0.27–4.2)

## 2025-07-14 PROCEDURE — 99213 OFFICE O/P EST LOW 20 MIN: CPT | Performed by: FAMILY MEDICINE

## 2025-07-14 PROCEDURE — 84443 ASSAY THYROID STIM HORMONE: CPT

## 2025-07-14 PROCEDURE — 84439 ASSAY OF FREE THYROXINE: CPT

## 2025-07-14 NOTE — PROGRESS NOTES
SUBJECTIVE:    Patient ID: Adelina Mckay is a 34 y.o. female.    Chief Complaint   Patient presents with    Graves' Disease     Reg f/u       HPI: office visit  History of Present Illness  The patient presents for evaluation of Graves' disease and mouth pain.    She discontinued the use of methimazole following her last appointment, which was on 05/15/2025. She reports experiencing mood swings, fatigue, and irritability. Her TSH levels were elevated during her previous visit. She expresses a preference for consulting with an endocrinologist who has experience in treating Graves' disease.    She also mentions that she occasionally takes hydrocodone for mouth pain, which can be triggered by activities such as talking, singing, or eating foods that require extensive chewing. Brushing her teeth too vigorously can also cause swelling. She has been dealing with this issue for the past 2 months. She had four dental implants removed and replaced with seven new ones. She was prescribed oxycodone 5 mg for pain management.    Marital Status: Divorce  Sleep: She reports sleeping for two weeks straight after her dental procedures.    PAST SURGICAL HISTORY:  She had four dental implants removed and replaced with seven new ones.        Review of Systems   Constitutional:  Positive for fatigue.   Endocrine: Positive for cold intolerance and heat intolerance.   Genitourinary:  Positive for vaginal bleeding. Negative for frequency.   Musculoskeletal:  Positive for arthralgias, myalgias, neck pain and neck stiffness.   Psychiatric/Behavioral:  Positive for decreased concentration, dysphoric mood and sleep disturbance. The patient is nervous/anxious.    All other systems reviewed and are negative.       OBJECTIVE:  /70   Pulse 89   Temp 98.2 °F (36.8 °C) (Infrared)   Resp 16   Ht 1.676 m (5' 6\")   Wt 52.5 kg (115 lb 12.8 oz)   SpO2 99% Comment: RA  BMI 18.69 kg/m²    Wt Readings from Last 3 Encounters:   07/14/25 52.5

## 2025-07-14 NOTE — PROGRESS NOTES
Chief Complaint   Patient presents with    Graves' Disease     Reg f/u       Have you seen any other physician or provider since your last visit yes - [facility]    Have you had any other diagnostic tests since your last visit? no    Have you changed or stopped any medications since your last visit? no

## 2025-07-16 DIAGNOSIS — F90.0 ATTENTION DEFICIT HYPERACTIVITY DISORDER (ADHD), PREDOMINANTLY INATTENTIVE TYPE: ICD-10-CM

## 2025-07-17 DIAGNOSIS — M62.838 MUSCLE SPASM: ICD-10-CM

## 2025-07-17 DIAGNOSIS — M54.2 NECK PAIN: ICD-10-CM

## 2025-07-18 RX ORDER — DEXTROAMPHETAMINE SACCHARATE, AMPHETAMINE ASPARTATE, DEXTROAMPHETAMINE SULFATE AND AMPHETAMINE SULFATE 7.5; 7.5; 7.5; 7.5 MG/1; MG/1; MG/1; MG/1
TABLET ORAL
Qty: 60 TABLET | Refills: 0 | Status: SHIPPED | OUTPATIENT
Start: 2025-07-18 | End: 2025-08-17

## 2025-07-18 RX ORDER — GABAPENTIN 100 MG/1
CAPSULE ORAL
Qty: 30 CAPSULE | Refills: 2 | Status: SHIPPED | OUTPATIENT
Start: 2025-07-18 | End: 2025-10-16

## 2025-07-18 NOTE — TELEPHONE ENCOUNTER
Refill request received from pharmacy      Next Office Visit Date:  No future appointments.    [facility] - Please review via PDMP        Last Office Visit:    7/14/2025

## 2025-08-12 DIAGNOSIS — F90.0 ATTENTION DEFICIT HYPERACTIVITY DISORDER (ADHD), PREDOMINANTLY INATTENTIVE TYPE: ICD-10-CM

## 2025-08-12 RX ORDER — DEXTROAMPHETAMINE SACCHARATE, AMPHETAMINE ASPARTATE, DEXTROAMPHETAMINE SULFATE AND AMPHETAMINE SULFATE 7.5; 7.5; 7.5; 7.5 MG/1; MG/1; MG/1; MG/1
1 TABLET ORAL 2 TIMES DAILY
Qty: 60 TABLET | Refills: 0 | Status: SHIPPED | OUTPATIENT
Start: 2025-08-12 | End: 2025-09-11